# Patient Record
Sex: FEMALE | Race: ASIAN | NOT HISPANIC OR LATINO | Employment: FULL TIME | URBAN - METROPOLITAN AREA
[De-identification: names, ages, dates, MRNs, and addresses within clinical notes are randomized per-mention and may not be internally consistent; named-entity substitution may affect disease eponyms.]

---

## 2018-01-12 ENCOUNTER — OFFICE VISIT (OUTPATIENT)
Dept: MEDICAL GROUP | Facility: MEDICAL CENTER | Age: 23
End: 2018-01-12
Payer: COMMERCIAL

## 2018-01-12 ENCOUNTER — HOSPITAL ENCOUNTER (OUTPATIENT)
Facility: MEDICAL CENTER | Age: 23
End: 2018-01-12
Attending: FAMILY MEDICINE
Payer: COMMERCIAL

## 2018-01-12 VITALS
RESPIRATION RATE: 16 BRPM | HEART RATE: 71 BPM | BODY MASS INDEX: 17.24 KG/M2 | SYSTOLIC BLOOD PRESSURE: 100 MMHG | OXYGEN SATURATION: 93 % | WEIGHT: 101 LBS | HEIGHT: 64 IN | DIASTOLIC BLOOD PRESSURE: 78 MMHG | TEMPERATURE: 98.1 F

## 2018-01-12 DIAGNOSIS — Z12.4 SCREENING FOR CERVICAL CANCER: ICD-10-CM

## 2018-01-12 DIAGNOSIS — Z11.3 ROUTINE SCREENING FOR STI (SEXUALLY TRANSMITTED INFECTION): ICD-10-CM

## 2018-01-12 DIAGNOSIS — Z01.419 WELL FEMALE EXAM WITH ROUTINE GYNECOLOGICAL EXAM: ICD-10-CM

## 2018-01-12 PROCEDURE — 99395 PREV VISIT EST AGE 18-39: CPT | Performed by: FAMILY MEDICINE

## 2018-01-12 NOTE — PROGRESS NOTES
cc: well exam    Subjective:     Nathalia Olson is a 23 y.o. female presents for a routine preventive health exam.    Ob-Gyn/ History:    /Para:    Last Pap Smear:  2017, ascus +other high risk hpv  Gyn Surgery:  none.  Current Contraceptive Method:  none. is currently sexually active.  Last menstrual period:  Last week.  Periods irregular  Folate intake: discussed     Health Maintenance  Advanced directive: n/a   Osteoporosis Screen/ DEXA: n/a   PT/vit D for falls prevention: n/a   Cholesterol Screening: declined   Diabetes Screening: declined  AAA Screening: n/a   Aspirin Use: n/a    Diet: well rounded   Exercise: regular   Substance Abuse: none   Safe in relationship.   Seat belts, bike helmet, gun safety discussed.  Sun protection used.    Cancer screening  Colorectal Cancer Screening: n/a    Lung Cancer Screening: n/a    Cervical Cancer Screening: collected today    Breast Cancer Screening: n/a     Infectious disease screening/Immunizations  --STI Screening: negative 2016, ordered today   --Practices safe sex.  --HIV Screening: negative 2016   --Hepatitis C Screening: n/a   --Immunizations:    Influenza: declined    HPV:  Up to date, pt to bring in records    Tetanus: Up to date, pt to bring in records    Shingles: n/a    Pneumococcal : n/a     Other immunizations: Up to date, pt to bring in records    Review of systems:   All systems were reviewed and are negative.    No current outpatient prescriptions on file.    No Known Allergies    History reviewed. No pertinent past medical history.  History reviewed. No pertinent surgical history.  Family History   Problem Relation Age of Onset   • Hypertension Mother      Social History     Social History   • Marital status: Single     Spouse name: N/A   • Number of children: N/A   • Years of education: N/A     Occupational History   • Not on file.     Social History Main Topics   • Smoking status: Never Smoker   • Smokeless tobacco: Never Used   •  "Alcohol use No   • Drug use: No   • Sexual activity: Yes     Partners: Male     Birth control/ protection: Injection     Other Topics Concern   • Not on file     Social History Narrative   • No narrative on file       Objective:     Vitals: /78   Pulse 71   Temp 36.7 °C (98.1 °F)   Resp 16   Ht 1.626 m (5' 4\")   Wt 45.8 kg (101 lb)   SpO2 93%   Breastfeeding? No   BMI 17.34 kg/m²   General: Alert, pleasant, NAD  HEENT:  Normocephalic.  PERRL, EOMI, no icterus or pallor.  Conjunctivae and lids normal.  External ears normal. Tympanic membranes pearly, opaque.  Nares patent, mucosa pink.  Oropharynx non-erythematous, mucous membranes moist.  Neck supple.  No thyromegaly or masses palpated. No cervical or supraclavicular lymphadenopathy.  Heart:  Regular rate and rhythm.  S1 and S2 normal.  No murmurs appreciated.  Respiratory:  Normal respiratory effort.  Clear to auscultation bilaterally.    Abdomen:  Bowel sounds present.  Non-distended, soft, non-tender, no guarding/rebound. No hepatosplenomegaly.  No hernias.  Pelvic exam: Normal external genitalia including urethral meatus.  Well supported, nontender urethra and bladder.  Vagina and cervix without significant discharge or lesions.  No cervical motion tenderness.  Anteflexed uterus of normal size, shape, and consistency.  No adnexal masses or tenderness.     Rectal:  Normal external anus, no hemorrhoids.   Skin:  Warm, dry, no rashes  Musculoskeletal:  Gait is normal.  Moves all extremities well.  Extremities:  Pedal pulses 2+ symmetric. No leg edema.  Neurological: No tremors, sensation grossly intact, patellar and biceps reflexes 2+ symmetric, tone/strength normal  Psych:  Affect/mood is normal, judgement is good, memory is intact, grooming is appropriate.      Assessment/Plan:     Nathalia was seen today for annual exam.    Diagnoses and all orders for this visit:    Well female exam with routine gynecological exam  Screening for cervical " cancer  Routine screening for STI (sexually transmitted infection)  -     THINPREP PAP W/CTNG; Future    Patient Counseling:  --Discussed moderation in sodium/caffeine intake, saturated fat and cholesterol, caloric balance, sufficient fresh fruits/vegetables, fiber, iron, and 0.4-0.8mg of folate supplement per day  --Discussed brushing, flossing, and dental visits.   --Encouraged regular exercise.   --Discussed tobacco, alcohol, or other drug use; availability of treatment for abuse.   --Discussed sexually transmitted infections, partner selection, use of condoms, avoidance of unintended pregnancy and contraceptive alternatives.  --Injury prevention: Discussed safety belts, safety helmets, smoke detector, etc.    Preventive visit in 1 year, sooner as needed for any concerns.

## 2018-01-16 LAB
C TRACH RRNA CVX QL NAA+PROBE: NEGATIVE
CYTOLOGIST CVX/VAG CYTO: NORMAL
N GONORRHOEA RRNA CVX QL NAA+PROBE: NEGATIVE
NOTE  190109: NORMAL
OTHER STN SPEC: NORMAL
PATH REPORT.FINAL DX SPEC: NORMAL
STAT OF ADQ CVX/VAG CYTO-IMP: NORMAL

## 2019-03-18 ENCOUNTER — OFFICE VISIT (OUTPATIENT)
Dept: MEDICAL GROUP | Facility: MEDICAL CENTER | Age: 24
End: 2019-03-18
Payer: COMMERCIAL

## 2019-03-18 VITALS
WEIGHT: 102 LBS | HEIGHT: 64 IN | BODY MASS INDEX: 17.42 KG/M2 | TEMPERATURE: 98.6 F | RESPIRATION RATE: 14 BRPM | DIASTOLIC BLOOD PRESSURE: 68 MMHG | OXYGEN SATURATION: 96 % | SYSTOLIC BLOOD PRESSURE: 100 MMHG | HEART RATE: 76 BPM

## 2019-03-18 DIAGNOSIS — N91.2 ABSENT PERIODS: ICD-10-CM

## 2019-03-18 PROCEDURE — 99213 OFFICE O/P EST LOW 20 MIN: CPT | Performed by: FAMILY MEDICINE

## 2019-03-18 ASSESSMENT — PATIENT HEALTH QUESTIONNAIRE - PHQ9: CLINICAL INTERPRETATION OF PHQ2 SCORE: 0

## 2019-03-18 NOTE — PROGRESS NOTES
"cc: Absent periods    Subjective:     Nathalia Olson is a 24 y.o. female presenting to discuss absent periods.  She has a history of irregular periods in the past, was on Depakote for years and was amenorrheic.  She has been off birth control for over 2 years, still has not had her period.  She occasionally gets light spotting for less than a day.  Denies any cramps, breast changes, breast discharge, chest pain, shortness of breath, lightheadedness, dizziness, tremors, heat/cold intolerance, diarrhea, constipation, abdominal pain, tremors.  She has a good appetite, but is unable to gain much weight.  She has been stable at 105-110 pounds.      Review of systems:  See above.     No current outpatient prescriptions on file.    Allergies, past medical history, past surgical history, family history, social history reviewed and updated    Objective:     Vitals: /68 (BP Location: Right arm, Patient Position: Sitting)   Pulse 76   Temp 37 °C (98.6 °F) (Temporal)   Resp 14   Ht 1.626 m (5' 4\")   Wt 46.3 kg (102 lb)   SpO2 96%   BMI 17.51 kg/m²   General: Alert, pleasant, NAD  HEENT: Normocephalic.  EOMI, no icterus or pallor.  Conjunctivae and lids normal. External ears normal. Oropharynx non-erythematous, mucous membranes moist.  Neck supple.  No thyromegaly or masses palpated. No cervical or supraclavicular lymphadenopathy.  Heart: Regular rate and rhythm.  S1 and S2 normal.  No murmurs appreciated.  Respiratory: Normal respiratory effort.  Clear to auscultation bilaterally.  Abdomen: Non-distended, soft. non-tender, no guarding/rebound. Bowel sounds present.  No hepatosplenomegaly.  No hernias.  Skin: Warm, dry, no rashes.  Musculoskeletal: Gait is normal.  Moves all extremities well.  Extremities: No leg edema.  Psych:  Affect/mood is normal, judgement is good, memory is intact, grooming is appropriate.    Assessment/Plan:     Diagnoses and all orders for this visit:    Absent periods  Will check the " following labs, ultrasound.  If normal, will refer to OB/GYN.  Also reminded patient that she is due for a Pap.  She had ASCUS with positive HPV 9/2017, normal Pap 1/2018.  -     CBC WITHOUT DIFFERENTIAL; Future  -     Comp Metabolic Panel; Future  -     TSH WITH REFLEX TO FT4; Future  -     PROLACTIN; Future  -     US-PELVIC TRANSVAGINAL ONLY; Future  -     HCG QUANTITATIVE; Future

## 2019-03-28 ENCOUNTER — HOSPITAL ENCOUNTER (OUTPATIENT)
Dept: LAB | Facility: MEDICAL CENTER | Age: 24
End: 2019-03-28
Attending: FAMILY MEDICINE
Payer: COMMERCIAL

## 2019-03-28 DIAGNOSIS — N91.2 ABSENT PERIODS: ICD-10-CM

## 2019-03-28 LAB
ALBUMIN SERPL BCP-MCNC: 4.5 G/DL (ref 3.2–4.9)
ALBUMIN/GLOB SERPL: 1.9 G/DL
ALP SERPL-CCNC: 33 U/L (ref 30–99)
ALT SERPL-CCNC: 11 U/L (ref 2–50)
ANION GAP SERPL CALC-SCNC: 9 MMOL/L (ref 0–11.9)
AST SERPL-CCNC: 17 U/L (ref 12–45)
B-HCG SERPL-ACNC: <0.6 MIU/ML (ref 0–5)
BILIRUB SERPL-MCNC: 0.8 MG/DL (ref 0.1–1.5)
BUN SERPL-MCNC: 8 MG/DL (ref 8–22)
CALCIUM SERPL-MCNC: 9.2 MG/DL (ref 8.5–10.5)
CHLORIDE SERPL-SCNC: 109 MMOL/L (ref 96–112)
CO2 SERPL-SCNC: 25 MMOL/L (ref 20–33)
CREAT SERPL-MCNC: 0.74 MG/DL (ref 0.5–1.4)
ERYTHROCYTE [DISTWIDTH] IN BLOOD BY AUTOMATED COUNT: 44.2 FL (ref 35.9–50)
GLOBULIN SER CALC-MCNC: 2.4 G/DL (ref 1.9–3.5)
GLUCOSE SERPL-MCNC: 85 MG/DL (ref 65–99)
HCT VFR BLD AUTO: 42.6 % (ref 37–47)
HGB BLD-MCNC: 13.6 G/DL (ref 12–16)
MCH RBC QN AUTO: 30 PG (ref 27–33)
MCHC RBC AUTO-ENTMCNC: 31.9 G/DL (ref 33.6–35)
MCV RBC AUTO: 94 FL (ref 81.4–97.8)
PLATELET # BLD AUTO: 263 K/UL (ref 164–446)
PMV BLD AUTO: 11 FL (ref 9–12.9)
POTASSIUM SERPL-SCNC: 3.8 MMOL/L (ref 3.6–5.5)
PROLACTIN SERPL-MCNC: 39.38 NG/ML (ref 2.8–26)
PROT SERPL-MCNC: 6.9 G/DL (ref 6–8.2)
RBC # BLD AUTO: 4.53 M/UL (ref 4.2–5.4)
SODIUM SERPL-SCNC: 143 MMOL/L (ref 135–145)
TSH SERPL DL<=0.005 MIU/L-ACNC: 0.5 UIU/ML (ref 0.38–5.33)
WBC # BLD AUTO: 4.4 K/UL (ref 4.8–10.8)

## 2019-03-28 PROCEDURE — 85027 COMPLETE CBC AUTOMATED: CPT

## 2019-03-28 PROCEDURE — 84443 ASSAY THYROID STIM HORMONE: CPT

## 2019-03-28 PROCEDURE — 36415 COLL VENOUS BLD VENIPUNCTURE: CPT

## 2019-03-28 PROCEDURE — 80053 COMPREHEN METABOLIC PANEL: CPT

## 2019-03-28 PROCEDURE — 84702 CHORIONIC GONADOTROPIN TEST: CPT

## 2019-03-28 PROCEDURE — 84146 ASSAY OF PROLACTIN: CPT

## 2019-04-01 ENCOUNTER — TELEPHONE (OUTPATIENT)
Dept: MEDICAL GROUP | Facility: MEDICAL CENTER | Age: 24
End: 2019-04-01

## 2019-04-01 DIAGNOSIS — N91.2 ABSENT PERIODS: ICD-10-CM

## 2019-04-01 DIAGNOSIS — R79.89 ELEVATED PROLACTIN LEVEL: ICD-10-CM

## 2019-04-03 NOTE — TELEPHONE ENCOUNTER
Left message for patient.  Will send my chart message and mail a letter.  MRI of the brain ordered

## 2019-04-15 ENCOUNTER — TELEPHONE (OUTPATIENT)
Dept: OBGYN | Facility: CLINIC | Age: 24
End: 2019-04-15

## 2019-04-15 NOTE — TELEPHONE ENCOUNTER
Pt called, was a prior patient of Dr. Golden and would like to get IUD. Informed pt she will see jessy DYKES and needs annual and IUD consultation at the time, pt verbalized understanding and agrees, call transferred to [facility] to schedule

## 2019-04-19 ENCOUNTER — HOSPITAL ENCOUNTER (OUTPATIENT)
Facility: MEDICAL CENTER | Age: 24
End: 2019-04-19
Attending: OBSTETRICS & GYNECOLOGY
Payer: COMMERCIAL

## 2019-04-19 ENCOUNTER — GYNECOLOGY VISIT (OUTPATIENT)
Dept: OBGYN | Facility: CLINIC | Age: 24
End: 2019-04-19
Payer: COMMERCIAL

## 2019-04-19 VITALS
BODY MASS INDEX: 17.66 KG/M2 | DIASTOLIC BLOOD PRESSURE: 64 MMHG | SYSTOLIC BLOOD PRESSURE: 98 MMHG | HEIGHT: 62 IN | WEIGHT: 96 LBS

## 2019-04-19 DIAGNOSIS — Z12.4 SCREENING FOR CERVICAL CANCER: ICD-10-CM

## 2019-04-19 DIAGNOSIS — Z30.015 ENCOUNTER FOR INITIAL PRESCRIPTION OF VAGINAL RING HORMONAL CONTRACEPTIVE: ICD-10-CM

## 2019-04-19 DIAGNOSIS — Z11.51 SPECIAL SCREENING EXAMINATION FOR HUMAN PAPILLOMAVIRUS (HPV): ICD-10-CM

## 2019-04-19 DIAGNOSIS — Z01.419 WELL WOMAN EXAM: ICD-10-CM

## 2019-04-19 PROCEDURE — 87491 CHLMYD TRACH DNA AMP PROBE: CPT

## 2019-04-19 PROCEDURE — 88175 CYTOPATH C/V AUTO FLUID REDO: CPT

## 2019-04-19 PROCEDURE — 87591 N.GONORRHOEAE DNA AMP PROB: CPT

## 2019-04-19 PROCEDURE — 99395 PREV VISIT EST AGE 18-39: CPT | Performed by: OBSTETRICS & GYNECOLOGY

## 2019-04-19 PROCEDURE — 87624 HPV HI-RISK TYP POOLED RSLT: CPT

## 2019-04-19 RX ORDER — ETONOGESTREL AND ETHINYL ESTRADIOL VAGINAL RING .015; .12 MG/D; MG/D
RING VAGINAL
Qty: 3 EACH | Refills: 3 | Status: SHIPPED | OUTPATIENT
Start: 2019-04-19 | End: 2020-03-18 | Stop reason: SDUPTHER

## 2019-04-19 NOTE — PROGRESS NOTES
"Nathalia Ferguson Ha24 y.o.  female presents for Annual Well Woman Exam.    ROS: Patient is feeling well. No dyspnea or chest pain on exertion. No Abdominal pain, change in bowel habits, black or bloody stools. No urinary sx. GYN ROS:normal menses, no abnormal bleeding, pelvic pain or discharge, no breast pain or new or enlarging lumps on self exam, no discharge or pelvic pain, she complains of amenorrhea. Denies breast tenderness, mass, discharge, changes in size or contour, or abnormal cyclic discomfort., had breast augmentation 2018 in [facility]. No neurological complaints. Not on contraception currently.   No past medical history on file.  None  Allergy:   Patient has no known allergies.    LMP:       Patient's last menstrual period was 2019 (exact date). .     Menstrual History: menses irregular: amenorrheic, not on contraception  Contraceptive Method:none, uses condoms      Objective : The patient appears well, alert and oriented x 3, in no acute distress.  BP (!) 98/64 (BP Location: Right arm, Patient Position: Sitting)   Ht 1.575 m (5' 2\")   Wt 43.5 kg (96 lb)   HEENT Exam: EOMI, YODIT, no adenopathy or thyromegaly.  Lungs: Clear to Auscultation and Percussion.  Cor: S1 and S2 normal, no murmurs, or rubs   Abdomen: Soft without tenderness, guarding mass or organomegaly.  Extremities: No edema, pulses equal  Neurological: Normal No focal signs  Breast Exam:Inspection negative. No nipple discharge or bleeding. No masses or nodularity palpable, breast augmentation  Pelvic: External genitalia,urethral meatus, urethra, bladder and vagina normal. Cervix, uterus and adnexa intact and normal.  Anus and perineum normal. Bimanual and rectovaginal without masses or tenderness., negative findings: external genitalia normal, cervix clear, normal sized uterus, adnexae negative, no pelvic or rectal masses    Lab:No results found for this or any previous visit (from the past 336 hour(s)).    Assessment:  well " woman, needs hormonal contraception to protect bones. Discussed low weight and need to gain weight in order to protect bones. Patient works the night shift and states she would sometimes rather sleep than eat. She has tried protein shakes and this does not help. She DOES want to gain weight. Her ideal would be 120.     Plan:pap smear  counseled on breast self exam, STD prevention, HIV risk factors and prevention, use and side effects of OCPs, osteoporosis and adequate intake of calcium and vitamin D  return annually or prn  Safe Sex/STD Prevention  Self Breast Exams  Calcium Supplements  Weight Bearing Exercise   Contraception:transvaginal hormones (NuvaRing)

## 2019-04-24 LAB
C TRACH DNA GENITAL QL NAA+PROBE: NEGATIVE
CYTOLOGY REG CYTOL: NORMAL
HPV HR 12 DNA CVX QL NAA+PROBE: NEGATIVE
HPV16 DNA SPEC QL NAA+PROBE: NEGATIVE
HPV18 DNA SPEC QL NAA+PROBE: NEGATIVE
N GONORRHOEA DNA GENITAL QL NAA+PROBE: NEGATIVE
SPECIMEN SOURCE: NORMAL
SPECIMEN SOURCE: NORMAL

## 2020-03-18 RX ORDER — ETONOGESTREL AND ETHINYL ESTRADIOL VAGINAL RING .015; .12 MG/D; MG/D
RING VAGINAL
Qty: 3 EACH | Refills: 3 | Status: SHIPPED | OUTPATIENT
Start: 2020-03-18 | End: 2022-01-11

## 2020-03-18 NOTE — PROGRESS NOTES
Prescription sent for patient with 4 month supply. Can schedule for annual exam in the next 2-3 months.     Martinez

## 2020-05-20 ENCOUNTER — HOSPITAL ENCOUNTER (OUTPATIENT)
Facility: MEDICAL CENTER | Age: 25
End: 2020-05-20
Payer: COMMERCIAL

## 2020-05-23 LAB
SARS-COV-2 RNA SPEC QL NAA+PROBE: NOT DETECTED
SPECIMEN SOURCE: NORMAL

## 2020-12-02 ENCOUNTER — GYNECOLOGY VISIT (OUTPATIENT)
Dept: OBGYN | Facility: CLINIC | Age: 25
End: 2020-12-02
Payer: COMMERCIAL

## 2020-12-02 ENCOUNTER — HOSPITAL ENCOUNTER (OUTPATIENT)
Facility: MEDICAL CENTER | Age: 25
End: 2020-12-02
Attending: OBSTETRICS & GYNECOLOGY
Payer: COMMERCIAL

## 2020-12-02 VITALS — WEIGHT: 119 LBS | BODY MASS INDEX: 21.77 KG/M2 | DIASTOLIC BLOOD PRESSURE: 75 MMHG | SYSTOLIC BLOOD PRESSURE: 105 MMHG

## 2020-12-02 DIAGNOSIS — Z01.419 ENCOUNTER FOR ANNUAL ROUTINE GYNECOLOGICAL EXAMINATION: ICD-10-CM

## 2020-12-02 DIAGNOSIS — Z32.02 PREGNANCY TEST NEGATIVE: ICD-10-CM

## 2020-12-02 DIAGNOSIS — Z31.69 INFERTILITY COUNSELING: ICD-10-CM

## 2020-12-02 DIAGNOSIS — Z12.4 CERVICAL CANCER SCREENING: ICD-10-CM

## 2020-12-02 DIAGNOSIS — N93.9 ABNORMAL UTERINE BLEEDING: ICD-10-CM

## 2020-12-02 LAB
INT CON NEG: NORMAL
INT CON POS: NORMAL
POC URINE PREGNANCY TEST: NEGATIVE

## 2020-12-02 PROCEDURE — 88175 CYTOPATH C/V AUTO FLUID REDO: CPT

## 2020-12-02 PROCEDURE — 87591 N.GONORRHOEAE DNA AMP PROB: CPT

## 2020-12-02 PROCEDURE — 81025 URINE PREGNANCY TEST: CPT | Performed by: OBSTETRICS & GYNECOLOGY

## 2020-12-02 PROCEDURE — 99395 PREV VISIT EST AGE 18-39: CPT | Performed by: OBSTETRICS & GYNECOLOGY

## 2020-12-02 PROCEDURE — 87491 CHLMYD TRACH DNA AMP PROBE: CPT

## 2020-12-02 ASSESSMENT — FIBROSIS 4 INDEX: FIB4 SCORE: 0.51

## 2020-12-02 NOTE — PROGRESS NOTES
Well woman visit     Nathalia Olson is a 25 y.o.  who presents for her annual exam. She has been off of birth control since April and she hasn't had regular cycles since then.    GYN History:  LMP:had some spotting on 20  Menarche: 13  Menses: irregular  Menopause: no  Last pap: 2018- normal. She states she had an abnormal 3 years ago and she was supposed to get a colposcopy but she had issues with her insurance and her next pap was normal.   Sexually active: yes  History of STDs: no  Fam Hx of breast, ovarian, or colon cancer: no     OB History:        Health Maintenance:  Last mammogram: no concerns  Last colorectal screening: n/a  Immunizations: had the Gardasil     Review of Systems:   Pertinent positives documented in HPI and all other systems reviewed & are negative.    All PMH, PSH, allergies, social history and FH reviewed and updated today:  Past Medical History:  History reviewed. No pertinent past medical history.    Past Surgical History:  History reviewed. No pertinent surgical history.    Medications:   Current Outpatient Medications Ordered in Epic   Medication Sig Dispense Refill   • ethinyl estradiol-etonogestrel (NUVARING) 0.12-0.015 MG/24HR vaginal ring Insert ring for three weeks, then remove for one week, then reinsert new ring after the end of that week. 3 Each 3     No current Epic-ordered facility-administered medications on file.        Allergies: Patient has no known allergies.    Social History:  Social History     Socioeconomic History   • Marital status: Single     Spouse name: Not on file   • Number of children: Not on file   • Years of education: Not on file   • Highest education level: Not on file   Occupational History   • Not on file   Social Needs   • Financial resource strain: Not on file   • Food insecurity     Worry: Not on file     Inability: Not on file   • Transportation needs     Medical: Not on file     Non-medical: Not on file   Tobacco Use   • Smoking  status: Never Smoker   • Smokeless tobacco: Never Used   Substance and Sexual Activity   • Alcohol use: No     Alcohol/week: 0.0 oz   • Drug use: No   • Sexual activity: Yes     Partners: Male     Birth control/protection: Injection   Lifestyle   • Physical activity     Days per week: Not on file     Minutes per session: Not on file   • Stress: Not on file   Relationships   • Social connections     Talks on phone: Not on file     Gets together: Not on file     Attends Yazidism service: Not on file     Active member of club or organization: Not on file     Attends meetings of clubs or organizations: Not on file     Relationship status: Not on file   • Intimate partner violence     Fear of current or ex partner: Not on file     Emotionally abused: Not on file     Physically abused: Not on file     Forced sexual activity: Not on file   Other Topics Concern   • Not on file   Social History Narrative   • Not on file       Family History:  Family History   Problem Relation Age of Onset   • Hypertension Mother            Objective:   Vitals:  /75   Wt 54 kg (119 lb)   Body mass index is 21.77 kg/m². (Goal BM I>18 <25)    Physical Exam:   Nursing note and vitals reviewed.  Physical Exam   Constitutional: She is oriented to person, place, and time and well-developed, well-nourished, and in no distress.   HENT:   Head: Normocephalic and atraumatic.   Right Ear: External ear normal.   Eyes: Pupils are equal, round, and reactive to light. Conjunctivae and EOM are normal.   Neck: Normal range of motion. Neck supple. No tracheal deviation present. No thyromegaly present.   Cardiovascular: Intact distal pulses.   Pulmonary/Chest: Effort normal and breath sounds normal.   Abdominal: Soft. Bowel sounds are normal. She exhibits no distension. There is no abdominal tenderness.   Musculoskeletal: Normal range of motion.   Neurological: She is alert and oriented to person, place, and time. Gait normal.   Skin: Skin is warm and  dry.   Psychiatric: Mood, memory, affect and judgment normal.     Genitourinary:  Normal appearing external female genitalia without any rashes, lesions, labial fusion or tenderness.  Vagina is pink moist and well rugated. Physiologic discharge present within vaginal vault.  Cervix well visualized without masses or lesions.  On bimanual exam there is no cervical motion tenderness, uterus is midline, not enlarged, fixed, or tender.  No adnexal masses or tenderness.   Breast: bilateral augmentation. No masses, skin dimpling, or lymphadenopathy noted bilaterally.  No nipple discharge.  Nipples everted.      Assessment/Plan:     1. Pregnancy test negative  POCT Pregnancy   2. Abnormal uterine bleeding  TSH+FREE T4    PROLACTIN    FSH/LH    ESTROGEN TOTAL    TESTOSTERONE SERUM   3. Infertility counseling  SEMEN ANALYSIS,BASIC   4. Cervical cancer screening  THINPREP RFLX HPV ASCUS W/CTNG   5. Encounter for annual routine gynecological examination         Nathalia Olson is a 25 y.o.  female who presents for her annual gynecologic exam.   # Preventative health care:   --Breast self awareness discussed.   --Cervical cancer screening. Last Pap 2018. Collected today. HPV reflex sent. I will follow up with patient once results are back as per ASCCP guidelines.   --Smoking not a smoker.   --Colorectal screening not indicated.   --Immunizations are up to date.  --Diet, exercise, vitamin supplementation, and hydration discussed.  # Contraception: wants to get pregnant  # STD screening: off of pap  # AUB- wants to conceive. Will start with ordering labs and semen analysis. Consider HSG if labs are normal. Then Femara for ovulation induction.   # Follow up in one month to discuss results.

## 2020-12-04 DIAGNOSIS — Z12.4 CERVICAL CANCER SCREENING: ICD-10-CM

## 2020-12-07 LAB
C TRACH DNA GENITAL QL NAA+PROBE: NEGATIVE
CYTOLOGY REG CYTOL: NORMAL
N GONORRHOEA DNA GENITAL QL NAA+PROBE: NEGATIVE
SPECIMEN SOURCE: NORMAL

## 2021-01-07 ENCOUNTER — TELEPHONE (OUTPATIENT)
Dept: MEDICAL GROUP | Facility: MEDICAL CENTER | Age: 26
End: 2021-01-07

## 2021-01-07 DIAGNOSIS — R52 BODY ACHES: ICD-10-CM

## 2021-01-07 DIAGNOSIS — R05.9 COUGH: ICD-10-CM

## 2021-01-07 DIAGNOSIS — R09.81 NASAL CONGESTION: ICD-10-CM

## 2021-01-07 DIAGNOSIS — R50.9 FEVER, UNSPECIFIED FEVER CAUSE: ICD-10-CM

## 2021-01-07 NOTE — TELEPHONE ENCOUNTER
Pt called and asked if you could put in an order for her to get tested for covid, she is having symptoms for the past 4 days, as well as was in contact with someone who was positive.,

## 2021-01-22 ENCOUNTER — TELEPHONE (OUTPATIENT)
Dept: OBGYN | Facility: CLINIC | Age: 26
End: 2021-01-22

## 2021-02-01 ENCOUNTER — TELEPHONE (OUTPATIENT)
Dept: OBGYN | Facility: CLINIC | Age: 26
End: 2021-02-01

## 2021-03-03 ENCOUNTER — GYNECOLOGY VISIT (OUTPATIENT)
Dept: OBGYN | Facility: CLINIC | Age: 26
End: 2021-03-03
Payer: COMMERCIAL

## 2021-03-03 VITALS — BODY MASS INDEX: 23.19 KG/M2 | DIASTOLIC BLOOD PRESSURE: 62 MMHG | WEIGHT: 126.8 LBS | SYSTOLIC BLOOD PRESSURE: 110 MMHG

## 2021-03-03 DIAGNOSIS — E28.2 PCOS (POLYCYSTIC OVARIAN SYNDROME): ICD-10-CM

## 2021-03-03 PROCEDURE — 99213 OFFICE O/P EST LOW 20 MIN: CPT | Performed by: OBSTETRICS & GYNECOLOGY

## 2021-03-03 ASSESSMENT — FIBROSIS 4 INDEX: FIB4 SCORE: 0.53

## 2021-03-03 NOTE — PROGRESS NOTES
GYN Consult    CC/reason for consult: FU results    HPI: Nathalia Olson is a 26 y.o.  with PCOS based off of labs. She is still not having periods on her own.  She has not been using any contraceptive for the last year and has not conceived.  She would like to have a baby.      ROS:  constitutional: denies fevers, general concerns  CV: denies chest pain, palpitations, edema  Resp: denies shortness of breath, cough  GI: denies abd pain, N/V, diarrhea/constipation, blood in stool  : denies irregular vaginal bleeding, discharge, pain, denies urinary complaints  Neuro: denies hx of migraines w/ aura  Endo: denies significant weight changes, irregular menses, temperature intolerance, denies hotflashes/nightsweats  Heme/lymph: denies easy bleeding/bruising, denies swollen glands  Psych: denies concerns about mood, denies SI  Allergy: denies concerns        OB History    Para Term  AB Living   0 0 0 0 0 0   SAB TAB Ectopic Molar Multiple Live Births   0 0 0   0         History reviewed. No pertinent past medical history.    History reviewed. No pertinent surgical history.    Medications:   Current Outpatient Medications Ordered in Epic   Medication Sig Dispense Refill   • ethinyl estradiol-etonogestrel (NUVARING) 0.12-0.015 MG/24HR vaginal ring Insert ring for three weeks, then remove for one week, then reinsert new ring after the end of that week. 3 Each 3     No current Epic-ordered facility-administered medications on file.       Allergies: Patient has no known allergies.    Social History     Socioeconomic History   • Marital status: Single     Spouse name: Not on file   • Number of children: Not on file   • Years of education: Not on file   • Highest education level: Not on file   Occupational History   • Not on file   Tobacco Use   • Smoking status: Never Smoker   • Smokeless tobacco: Never Used   Substance and Sexual Activity   • Alcohol use: No     Alcohol/week: 0.0 oz   • Drug use: No   •  Sexual activity: Yes     Partners: Male     Birth control/protection: Injection   Other Topics Concern   • Not on file   Social History Narrative   • Not on file     Social Determinants of Health     Financial Resource Strain:    • Difficulty of Paying Living Expenses:    Food Insecurity:    • Worried About Running Out of Food in the Last Year:    • Ran Out of Food in the Last Year:    Transportation Needs:    • Lack of Transportation (Medical):    • Lack of Transportation (Non-Medical):    Physical Activity:    • Days of Exercise per Week:    • Minutes of Exercise per Session:    Stress:    • Feeling of Stress :    Social Connections:    • Frequency of Communication with Friends and Family:    • Frequency of Social Gatherings with Friends and Family:    • Attends Druze Services:    • Active Member of Clubs or Organizations:    • Attends Club or Organization Meetings:    • Marital Status:    Intimate Partner Violence:    • Fear of Current or Ex-Partner:    • Emotionally Abused:    • Physically Abused:    • Sexually Abused:        Family History   Problem Relation Age of Onset   • Hypertension Mother      Denies hx of GI/GYN/breast cancers    Physical Exam:  /62 (BP Location: Right arm, Patient Position: Sitting, BP Cuff Size: Adult)   Wt 57.5 kg (126 lb 12.8 oz)   BMI 23.19 kg/m²   gen: AAO, NAD, affect appropriate  CV: no LE edema  Resp: Symmetric non labored breathing  Skin: warm/dry, no lesions  Psych: normal mood and affect    A/P: 26 y.o.  with PCOS  1. PCOS (polycystic ovarian syndrome)  HEMOGLOBIN A1C    REFERRAL TO INFERTILITY     Discussed diagnosis with patient today.  Explained PCOS and why it causes anovulation.  Also explained insulin resistance, and with PCOS.  We did order hemoglobin A1c.  I discussed with her the increased risk for twins in women with PCOS.  I discussed with her that I could offer her ovulation induction in this office however I would not offer ultrasounds to  screen for if she has more than one dominant follicle before ovulation.  I discussed that twin pregnancies carry increased risk including gestational diabetes, hypertension,  labor, and  section.  She states that she would like a referral to the fertility specialist in [facility] for ovulation induction.  I do not have a record of her partner's semen analysis.  I encouraged her to have him collect a record for himself so that he can take that with them to their initial consult with them.    15 minutes were spent with patient ad more than 50% of that time was spent counseling face-to-face, educating and coordinating care.

## 2021-03-03 NOTE — NON-PROVIDER
Pt here for Gyn/Follow-up visit  Good Phone#: [phone]  Pharmacy verified.  Pt states would like to discuss results.  Pt states no other complaints for today.

## 2021-03-10 DIAGNOSIS — N91.2 AMENORRHEA: ICD-10-CM

## 2021-03-15 ENCOUNTER — TELEPHONE (OUTPATIENT)
Dept: OBGYN | Facility: CLINIC | Age: 26
End: 2021-03-15

## 2021-03-15 NOTE — TELEPHONE ENCOUNTER
Called Pt butN/A, LVMTCB. Left message regarding if Pt has had seen Dr. Myers's patient message sent to her. If Pt calls back, please see Dr. Myers's previous note on 3/10/2021. Thank you

## 2021-03-16 ENCOUNTER — TELEPHONE (OUTPATIENT)
Dept: OBGYN | Facility: CLINIC | Age: 26
End: 2021-03-16

## 2021-03-16 NOTE — TELEPHONE ENCOUNTER
Pt called about recent lab results. She also asked about cost of HSG. I informed her she would need to call the lab for accurate cost.

## 2021-03-18 ENCOUNTER — TELEPHONE (OUTPATIENT)
Dept: OBGYN | Facility: CLINIC | Age: 26
End: 2021-03-18

## 2021-03-18 NOTE — TELEPHONE ENCOUNTER
Patient called today about her HSG that she is suppose to get done. She tried calling radiology to schedule but they would not schedule her because she has not not had a period in the last six months. Per my consult with  she advised that I talk with Dr. Alan to see if she wants to put her on medication to see if she gets her period. Or if she can talk to radiology about this.

## 2021-03-23 NOTE — PROGRESS NOTES
[facility] semen analysis abnormal. Patient has an appointment 4/1/21 with [facility]

## 2022-01-11 ENCOUNTER — HOSPITAL ENCOUNTER (OUTPATIENT)
Facility: MEDICAL CENTER | Age: 27
End: 2022-01-11
Attending: FAMILY MEDICINE
Payer: COMMERCIAL

## 2022-01-11 ENCOUNTER — OFFICE VISIT (OUTPATIENT)
Dept: URGENT CARE | Facility: PHYSICIAN GROUP | Age: 27
End: 2022-01-11
Payer: COMMERCIAL

## 2022-01-11 VITALS
WEIGHT: 125.8 LBS | BODY MASS INDEX: 22.29 KG/M2 | SYSTOLIC BLOOD PRESSURE: 104 MMHG | OXYGEN SATURATION: 95 % | DIASTOLIC BLOOD PRESSURE: 62 MMHG | RESPIRATION RATE: 16 BRPM | HEART RATE: 71 BPM | TEMPERATURE: 98 F | HEIGHT: 63 IN

## 2022-01-11 DIAGNOSIS — Z03.818 ENCOUNTER FOR PATIENT CONCERN ABOUT EXPOSURE TO INFECTIOUS ORGANISM: ICD-10-CM

## 2022-01-11 PROCEDURE — 99213 OFFICE O/P EST LOW 20 MIN: CPT | Mod: CS | Performed by: FAMILY MEDICINE

## 2022-01-11 PROCEDURE — U0003 INFECTIOUS AGENT DETECTION BY NUCLEIC ACID (DNA OR RNA); SEVERE ACUTE RESPIRATORY SYNDROME CORONAVIRUS 2 (SARS-COV-2) (CORONAVIRUS DISEASE [COVID-19]), AMPLIFIED PROBE TECHNIQUE, MAKING USE OF HIGH THROUGHPUT TECHNOLOGIES AS DESCRIBED BY CMS-2020-01-R: HCPCS

## 2022-01-11 RX ORDER — PROGESTERONE 50 MG/ML
INJECTION, SOLUTION INTRAMUSCULAR
COMMUNITY
Start: 2022-01-04 | End: 2022-03-31

## 2022-01-11 RX ORDER — ASPIRIN 81 MG/1
TABLET, COATED ORAL
Status: ON HOLD | COMMUNITY
Start: 2022-01-07 | End: 2022-08-11

## 2022-01-11 RX ORDER — METFORMIN HYDROCHLORIDE 500 MG/1
TABLET, EXTENDED RELEASE ORAL
Status: ON HOLD | COMMUNITY
Start: 2021-12-09 | End: 2022-08-11

## 2022-01-12 DIAGNOSIS — Z03.818 ENCOUNTER FOR PATIENT CONCERN ABOUT EXPOSURE TO INFECTIOUS ORGANISM: ICD-10-CM

## 2022-01-12 NOTE — PROGRESS NOTES
"  Subjective:      27 y.o. female presents to urgent care for cold symptoms that started Sunday.  She is experiencing sore throat. No cough, headache, body aches, diarrhea, or fevers. She denies any tobacco product use.  No history of asthma or COPD.  She is fully vaccinated against COVID.  She has had some COVID positive contacts.  She needs a negative PCR before she can return to work.  She is approximately 8 weeks pregnant.    She denies any other questions or concerns at this time.    Current problem list, medication, and past medical/surgical history were reviewed in Epic.    ROS  See HPI     Objective:      /62   Pulse 71   Temp 36.7 °C (98 °F)   Resp 16   Ht 1.6 m (5' 3\")   Wt 57.1 kg (125 lb 12.8 oz)   SpO2 95%   BMI 22.28 kg/m²     Physical Exam  Constitutional:       General: She is not in acute distress.     Appearance: She is not diaphoretic.   HENT:      Mouth/Throat:      Pharynx: Uvula midline. No posterior oropharyngeal erythema.      Tonsils: No tonsillar exudate. 2+ on the right. 2+ on the left.   Cardiovascular:      Rate and Rhythm: Normal rate and regular rhythm.      Heart sounds: Normal heart sounds.   Pulmonary:      Effort: Pulmonary effort is normal. No respiratory distress.      Breath sounds: Normal breath sounds.   Neurological:      Mental Status: She is alert.   Psychiatric:         Mood and Affect: Affect normal.         Judgment: Judgment normal.       Assessment/Plan:     1. Encounter for patient concern about exposure to infectious organism  Testing performed for COVID-19. In the meantime patient was advised to isolate until COVID test results returned. I encouraged mask use, frequent handwashing, wiping down hard surfaces, etc. Tylenol and Ibuprofen were recommended for symptomatic relief. If positive they will be contacted by their local health department regarding return to work/school protocols. Patient is currently without indication of need for higher level of " care. Patient/Guardian was given precautionary signs/symptoms that mandate immediate follow up and evaluation in the ED. The patient and/or guardian demonstrated a good understanding and agreed with the treatment plan.  - SARS-CoV-2 PCR (24 hour In-House): Collect NP swab in VTM; Future      Instructed to return to Urgent Care or nearest Emergency Department if symptoms fail to improve, for any change in condition, further concerns, or new concerning symptoms. Patient states understanding of the plan of care and discharge instructions.    Lor Lackey M.D.

## 2022-01-14 LAB
SARS-COV-2 RNA RESP QL NAA+PROBE: NOTDETECTED
SPECIMEN SOURCE: NORMAL

## 2022-02-08 ENCOUNTER — INITIAL PRENATAL (OUTPATIENT)
Dept: OBGYN | Facility: CLINIC | Age: 27
End: 2022-02-08
Payer: COMMERCIAL

## 2022-02-08 ENCOUNTER — HOSPITAL ENCOUNTER (OUTPATIENT)
Facility: MEDICAL CENTER | Age: 27
End: 2022-02-08
Attending: OBSTETRICS & GYNECOLOGY
Payer: COMMERCIAL

## 2022-02-08 VITALS
BODY MASS INDEX: 22.52 KG/M2 | HEIGHT: 63 IN | SYSTOLIC BLOOD PRESSURE: 111 MMHG | DIASTOLIC BLOOD PRESSURE: 69 MMHG | WEIGHT: 127.1 LBS

## 2022-02-08 DIAGNOSIS — Z34.91 FIRST TRIMESTER PREGNANCY: ICD-10-CM

## 2022-02-08 PROCEDURE — 0501F PRENATAL FLOW SHEET: CPT | Performed by: OBSTETRICS & GYNECOLOGY

## 2022-02-08 PROCEDURE — 87591 N.GONORRHOEAE DNA AMP PROB: CPT

## 2022-02-08 PROCEDURE — 88175 CYTOPATH C/V AUTO FLUID REDO: CPT

## 2022-02-08 PROCEDURE — 87491 CHLMYD TRACH DNA AMP PROBE: CPT

## 2022-02-08 NOTE — PROGRESS NOTES
CC: New Ob visit    Subjective Ms. Nathalia Olson  IVF pregnancy transfer date 21 and DEANDRE 22. She denies any contractions/cramping, leakage of fluid, vaginal bleeding. She does not feel movement yet.    I have reviewed the patients' medical, surgical, gynecological, obstetrical, social, and family histories, medications and available lab results and pertinent notes are as follows:    OB History    Para Term  AB Living   2 0 0 0 1 0   SAB IAB Ectopic Molar Multiple Live Births   1 0 0 0 0 0       Past Gynecological History:    Denies fibroids, ovarian cysts, abnormal pap smears, STDs. She denies ever having surgery on her cervix, uterus, or ovaries in the past. Last pap smear was 2020.     History reviewed. No pertinent past medical history.    Past Surgical History:   Procedure Laterality Date   • BREAST RECONSTRUCTION         Social History     Socioeconomic History   • Marital status: Single     Spouse name: Not on file   • Number of children: Not on file   • Years of education: Not on file   • Highest education level: Not on file   Occupational History   • Not on file   Tobacco Use   • Smoking status: Never Smoker   • Smokeless tobacco: Never Used   Vaping Use   • Vaping Use: Never used   Substance and Sexual Activity   • Alcohol use: No     Alcohol/week: 0.0 oz   • Drug use: No   • Sexual activity: Yes     Partners: Male     Birth control/protection: Ring     Comment: Planned pregnancy.   Other Topics Concern   • Not on file   Social History Narrative   • Not on file     Social Determinants of Health     Financial Resource Strain:    • Difficulty of Paying Living Expenses: Not on file   Food Insecurity:    • Worried About Running Out of Food in the Last Year: Not on file   • Ran Out of Food in the Last Year: Not on file   Transportation Needs:    • Lack of Transportation (Medical): Not on file   • Lack of Transportation (Non-Medical): Not on file   Physical Activity:    • Days  of Exercise per Week: Not on file   • Minutes of Exercise per Session: Not on file   Stress:    • Feeling of Stress : Not on file   Social Connections:    • Frequency of Communication with Friends and Family: Not on file   • Frequency of Social Gatherings with Friends and Family: Not on file   • Attends Hoahaoism Services: Not on file   • Active Member of Clubs or Organizations: Not on file   • Attends Club or Organization Meetings: Not on file   • Marital Status: Not on file   Intimate Partner Violence:    • Fear of Current or Ex-Partner: Not on file   • Emotionally Abused: Not on file   • Physically Abused: Not on file   • Sexually Abused: Not on file   Housing Stability:    • Unable to Pay for Housing in the Last Year: Not on file   • Number of Places Lived in the Last Year: Not on file   • Unstable Housing in the Last Year: Not on file       Family History:   Family History   Problem Relation Age of Onset   • Hypertension Mother        Genetic Screening/Teratology Counseling- Includes patient, baby's father, or anyone in either family with:  Patient's age 35 years or older as of estimated date of delivery: No   Thalassemia (Italian, Greek, Mediterranean, or  background): MCV less than 80: No   Neural tube defect (Meningomyelocele, Spina bifida, or Anencephaly): No   Congenital heart defect: No   Down syndrome: No   Rex-Sachs (Ashkenazi Mu-ism, Cajun, Mohawk Macanese): No   Canavan disease (Ashkenazi Mu-ism): No   Familial dysautonomia (Ashkenazi Mu-ism): No   Sickle cell disease or trait (): No   Hemophilia or other blood disorders: No   Muscular dystrophy: No    Cystic fibrosis: No   Scipio's chorea: No   Mental retardation/autism: No   Other inherited genetic or chromosomal disorder: No   Maternal metabolic disorder (eg. Type 1 diabetes, PKU): No   Patient or baby's father had child with birth defects not listed above: No   Recurrent pregnancy loss, or a stillbirth: No             Infection  "Prevention  1. High Risk For HIV: No 6. Rash Or Illness Since LMP: No   2. High Risk For Hepatitis B or C: No 7. History Of STD, GC, Chlamydia, HPV Syphilis: No   3. Live With Someone With TB Or Exposed To TB: No 8. Have a cat in the home?: No   4. Patient Or Partner Has A History Of Herpes: No    5. History of Chicken Pox: No           Allergies: Patient has no known allergies.    Objective:/69   Ht 1.6 m (5' 3\")   Wt 57.7 kg (127 lb 1.6 oz)     Objective:   Vitals:    02/08/22 0753   BP: 111/69       Prenatal Physical:  General Exam:  HEENT: normal    Heart: normal    Thyroid: normal    Lungs: normal    Lymph Nodes: normal    Breasts: normal    Neurological: normal    Abdomen: normal    Skin: normal    Extremities: normal    Pelvic Exam:  Vulva:  Normal  Vagina:  Normal  Cervix:  Normal  Rectum:  Normal      Lab:   Recent Results (from the past 672 hour(s))   SARS-CoV-2 PCR (24 hour In-House): Collect NP swab in VTM    Collection Time: 01/11/22  7:01 PM    Specimen: Respirate   Result Value Ref Range    SARS-CoV-2 Source Nasal Swab     SARS-CoV-2 by PCR NotDetected        Ultrasound: Reviewed initial scan and DEANDRE 8/23/22 based off of IVF embryo transfer date.     Assessment:    --Normal Exam at 12 weeks    --IVF pregnancy- has appt with [facility] in 2 weeks. Discussed continuation of aspirin 81 mg daily as well as need for fetal echo around 22 to 23 weeks.      Plan:    Reviewed the patients risk factors for this pregnancy and recommend the need for routine prenatal screening- see above.     Genetic screening was discussed with literature on Prenatal Screening, Cystic Fibrosis, and SMA provided and the patient plans to - already did pre-implantation genetic screening.     Discuss importance of water intake, controlled caloric intake, eating 5 small meals throughout the day to maintain blood sugar and taking PNV    If has nausea, take Vitamin B6 25mg TID with doxylamine/Unisom 25mg at night    Discuss importance " of exercise, as well as rest    Lab slip for Prenatal labs (if not already completed)    Discuss policy for OB care at [facility]     Follow up in 4 weeks for next visit

## 2022-02-09 DIAGNOSIS — Z34.91 FIRST TRIMESTER PREGNANCY: ICD-10-CM

## 2022-03-04 ENCOUNTER — HOSPITAL ENCOUNTER (OUTPATIENT)
Dept: LAB | Facility: MEDICAL CENTER | Age: 27
End: 2022-03-04
Attending: OBSTETRICS & GYNECOLOGY
Payer: COMMERCIAL

## 2022-03-04 DIAGNOSIS — Z34.91 FIRST TRIMESTER PREGNANCY: ICD-10-CM

## 2022-03-04 LAB
ABO GROUP BLD: NORMAL
BASOPHILS # BLD AUTO: 0.5 % (ref 0–1.8)
BASOPHILS # BLD: 0.05 K/UL (ref 0–0.12)
BLD GP AB SCN SERPL QL: NORMAL
EOSINOPHIL # BLD AUTO: 0.28 K/UL (ref 0–0.51)
EOSINOPHIL NFR BLD: 2.7 % (ref 0–6.9)
ERYTHROCYTE [DISTWIDTH] IN BLOOD BY AUTOMATED COUNT: 42.9 FL (ref 35.9–50)
GLUCOSE 1H P 50 G GLC PO SERPL-MCNC: 115 MG/DL (ref 70–139)
HBV SURFACE AG SER QL: ABNORMAL
HCT VFR BLD AUTO: 38.8 % (ref 37–47)
HCV AB SER QL: ABNORMAL
HGB BLD-MCNC: 12.7 G/DL (ref 12–16)
HIV 1+2 AB+HIV1 P24 AG SERPL QL IA: NORMAL
IMM GRANULOCYTES # BLD AUTO: 0.04 K/UL (ref 0–0.11)
IMM GRANULOCYTES NFR BLD AUTO: 0.4 % (ref 0–0.9)
LYMPHOCYTES # BLD AUTO: 2.33 K/UL (ref 1–4.8)
LYMPHOCYTES NFR BLD: 22.2 % (ref 22–41)
MCH RBC QN AUTO: 29.3 PG (ref 27–33)
MCHC RBC AUTO-ENTMCNC: 32.7 G/DL (ref 33.6–35)
MCV RBC AUTO: 89.6 FL (ref 81.4–97.8)
MONOCYTES # BLD AUTO: 0.5 K/UL (ref 0–0.85)
MONOCYTES NFR BLD AUTO: 4.8 % (ref 0–13.4)
NEUTROPHILS # BLD AUTO: 7.29 K/UL (ref 2–7.15)
NEUTROPHILS NFR BLD: 69.4 % (ref 44–72)
NRBC # BLD AUTO: 0 K/UL
NRBC BLD-RTO: 0 /100 WBC
PLATELET # BLD AUTO: 298 K/UL (ref 164–446)
PMV BLD AUTO: 10.8 FL (ref 9–12.9)
RBC # BLD AUTO: 4.33 M/UL (ref 4.2–5.4)
RH BLD: NORMAL
RUBV AB SER QL: 72.5 IU/ML
T PALLIDUM AB SER QL IA: ABNORMAL
WBC # BLD AUTO: 10.5 K/UL (ref 4.8–10.8)

## 2022-03-04 PROCEDURE — 86850 RBC ANTIBODY SCREEN: CPT

## 2022-03-04 PROCEDURE — 87389 HIV-1 AG W/HIV-1&-2 AB AG IA: CPT

## 2022-03-04 PROCEDURE — 85025 COMPLETE CBC W/AUTO DIFF WBC: CPT

## 2022-03-04 PROCEDURE — 36415 COLL VENOUS BLD VENIPUNCTURE: CPT

## 2022-03-04 PROCEDURE — 81511 FTL CGEN ABNOR FOUR ANAL: CPT

## 2022-03-04 PROCEDURE — 87340 HEPATITIS B SURFACE AG IA: CPT

## 2022-03-04 PROCEDURE — 86803 HEPATITIS C AB TEST: CPT

## 2022-03-04 PROCEDURE — 86901 BLOOD TYPING SEROLOGIC RH(D): CPT

## 2022-03-04 PROCEDURE — 86592 SYPHILIS TEST NON-TREP QUAL: CPT

## 2022-03-04 PROCEDURE — 82950 GLUCOSE TEST: CPT

## 2022-03-04 PROCEDURE — 86900 BLOOD TYPING SEROLOGIC ABO: CPT

## 2022-03-04 PROCEDURE — 86762 RUBELLA ANTIBODY: CPT

## 2022-03-04 PROCEDURE — 80307 DRUG TEST PRSMV CHEM ANLYZR: CPT

## 2022-03-04 PROCEDURE — 86780 TREPONEMA PALLIDUM: CPT

## 2022-03-06 LAB
AMPHET CTO UR CFM-MCNC: NEGATIVE NG/ML
BARBITURATES CTO UR CFM-MCNC: NEGATIVE NG/ML
BENZODIAZ CTO UR CFM-MCNC: NEGATIVE NG/ML
CANNABINOIDS CTO UR CFM-MCNC: NEGATIVE NG/ML
COCAINE CTO UR CFM-MCNC: NEGATIVE NG/ML
DRUG COMMENT 753798: NORMAL
METHADONE CTO UR CFM-MCNC: NEGATIVE NG/ML
OPIATES CTO UR CFM-MCNC: NEGATIVE NG/ML
PCP CTO UR CFM-MCNC: NEGATIVE NG/ML
PROPOXYPH CTO UR CFM-MCNC: NEGATIVE NG/ML

## 2022-03-09 LAB
# FETUSES US: NORMAL
AFP MOM SERPL: 1.07
AFP SERPL-MCNC: 34 NG/ML
AGE - REPORTED: 28.6 YR
CURRENT SMOKER: NO
FAMILY MEMBER DISEASES HX: NO
GA METHOD: NORMAL
GA: NORMAL WK
HCG MOM SERPL: 2.38
HCG SERPL-ACNC: NORMAL IU/L
HX OF HEREDITARY DISORDERS: NO
IDDM PATIENT QL: NO
INHIBIN A MOM SERPL: 1.56
INHIBIN A SERPL-MCNC: 272 PG/ML
INTEGRATED SCN PATIENT-IMP: NORMAL
PATHOLOGY STUDY: NORMAL
SPECIMEN DRAWN SERPL: NORMAL
U ESTRIOL MOM SERPL: 1.43
U ESTRIOL SERPL-MCNC: 1.17 NG/ML

## 2022-03-10 ENCOUNTER — ROUTINE PRENATAL (OUTPATIENT)
Dept: OBGYN | Facility: CLINIC | Age: 27
End: 2022-03-10
Payer: COMMERCIAL

## 2022-03-10 VITALS — WEIGHT: 125.8 LBS | BODY MASS INDEX: 22.28 KG/M2 | SYSTOLIC BLOOD PRESSURE: 119 MMHG | DIASTOLIC BLOOD PRESSURE: 67 MMHG

## 2022-03-10 DIAGNOSIS — O09.812 PREGNANCY RESULTING FROM IN VITRO FERTILIZATION IN SECOND TRIMESTER: ICD-10-CM

## 2022-03-10 DIAGNOSIS — Z87.42 HISTORY OF PCOS: ICD-10-CM

## 2022-03-10 DIAGNOSIS — Z34.02 SUPERVISION OF NORMAL FIRST PREGNANCY IN SECOND TRIMESTER: Primary | ICD-10-CM

## 2022-03-10 PROBLEM — O09.819 PREGNANCY CONCEIVED THROUGH IN VITRO FERTILIZATION: Status: ACTIVE | Noted: 2022-03-10

## 2022-03-10 PROCEDURE — 0502F SUBSEQUENT PRENATAL CARE: CPT | Performed by: NURSE PRACTITIONER

## 2022-03-10 NOTE — NON-PROVIDER
Pt. Here for OB/FU. Reports Good FM.   Good # [phone]  Pt. Denies VB, LOF, or UC's.   Pharmacy verified.   Chaperone offered and not indicataed  Patient states no concerns at the moment.   U/S not yet scheduled

## 2022-03-10 NOTE — PROGRESS NOTES
No concerns today  Has US scheduled with [facility] in 3 weeks  Recent lab results reviewed, up to date  Did discuss need to repeat 1 hour glucose at 26-28 weeks  PTL/SAB precautions reviewed, all questions answered  COVID booster recommended    Umu Jose CNM, APRN

## 2022-04-08 ENCOUNTER — ROUTINE PRENATAL (OUTPATIENT)
Dept: OBGYN | Facility: CLINIC | Age: 27
End: 2022-04-08
Payer: COMMERCIAL

## 2022-04-08 VITALS — DIASTOLIC BLOOD PRESSURE: 71 MMHG | WEIGHT: 134 LBS | BODY MASS INDEX: 23.74 KG/M2 | SYSTOLIC BLOOD PRESSURE: 121 MMHG

## 2022-04-08 DIAGNOSIS — O44.40 LOW-LYING PLACENTA: ICD-10-CM

## 2022-04-08 DIAGNOSIS — O09.812 PREGNANCY RESULTING FROM IN VITRO FERTILIZATION IN SECOND TRIMESTER: ICD-10-CM

## 2022-04-08 PROCEDURE — 0502F SUBSEQUENT PRENATAL CARE: CPT | Performed by: NURSE PRACTITIONER

## 2022-04-08 NOTE — PROGRESS NOTES
SUBJECTIVE:  Pt is a 27 y.o.   at 20w4d  gestation. Presents today for follow-up prenatal care. Reports no issues at this time.  Reports  fetal movement. Denies regular cramping/contractions, bleeding or leaking of fluid. Denies dysuria, headaches, N/V. Generally feels well today.     OBJECTIVE:  - See prenatal vitals flow    ASSESSMENT:   - IUP at 20w4d    - S=D   -   Patient Active Problem List    Diagnosis Date Noted   • Pregnancy conceived through in vitro fertilization 03/10/2022   • History of PCOS 03/10/2022   • BILLY (generalized anxiety disorder) 2016         PLAN:  - S/sx pregnancy and labor warning signs vs general discomforts discussed  - Fetal movements and/or kick counts reviewed   - Adequate hydration reinforced  - Nutrition/exercise/vitamin education; continue PNV  - Saw [facility] and has follow up this month   - Reviewed low-lying placenta precautions which [facility] reviewed with her  - Reviewed COVID-19 vaccination recommendations: pt reports she does not think she wants to get the booster  - Anticipatory guidance given  - RTC in 4 weeks for follow-up prenatal care

## 2022-05-12 ENCOUNTER — ROUTINE PRENATAL (OUTPATIENT)
Dept: OBGYN | Facility: CLINIC | Age: 27
End: 2022-05-12
Payer: COMMERCIAL

## 2022-05-12 VITALS — WEIGHT: 141.1 LBS | BODY MASS INDEX: 24.99 KG/M2 | DIASTOLIC BLOOD PRESSURE: 66 MMHG | SYSTOLIC BLOOD PRESSURE: 106 MMHG

## 2022-05-12 DIAGNOSIS — Z34.93 THIRD TRIMESTER PREGNANCY: ICD-10-CM

## 2022-05-12 PROCEDURE — 90040 PR PRENATAL FOLLOW UP: CPT | Performed by: OBSTETRICS & GYNECOLOGY

## 2022-05-31 ENCOUNTER — APPOINTMENT (OUTPATIENT)
Dept: URGENT CARE | Facility: CLINIC | Age: 27
End: 2022-05-31
Payer: COMMERCIAL

## 2022-06-09 ENCOUNTER — ROUTINE PRENATAL (OUTPATIENT)
Dept: OBGYN | Facility: CLINIC | Age: 27
End: 2022-06-09
Payer: COMMERCIAL

## 2022-06-09 VITALS — BODY MASS INDEX: 25.97 KG/M2 | SYSTOLIC BLOOD PRESSURE: 119 MMHG | DIASTOLIC BLOOD PRESSURE: 66 MMHG | WEIGHT: 146.6 LBS

## 2022-06-09 DIAGNOSIS — Z34.83 ENCOUNTER FOR SUPERVISION OF OTHER NORMAL PREGNANCY IN THIRD TRIMESTER: ICD-10-CM

## 2022-06-09 PROCEDURE — 0502F SUBSEQUENT PRENATAL CARE: CPT | Performed by: NURSE PRACTITIONER

## 2022-06-09 NOTE — PROGRESS NOTES
SUBJECTIVE:  Pt is a 27 y.o.   at 29w3d  gestation. Presents today for follow-up prenatal care. Reports no issues at this time.  Reports good fetal movement. Denies regular cramping/contractions, bleeding or leaking of fluid. Denies dysuria, headaches, N/V. Generally feels well today.     OBJECTIVE:  - See prenatal vitals flow  -   Vitals:    22 1450   BP: 119/66   Weight: 66.5 kg (146 lb 9.6 oz)                 ASSESSMENT:   - IUP at 29w3d    - S=D   -   Patient Active Problem List    Diagnosis Date Noted   • Low-lying placenta 2022   • Pregnancy conceived through in vitro fertilization 03/10/2022   • History of PCOS 03/10/2022   • BILLY (generalized anxiety disorder) 2016         PLAN:  - S/sx pregnancy and labor warning signs vs general discomforts discussed  - Fetal movements and/or kick counts reviewed   - Adequate hydration reinforced  - Nutrition/exercise/vitamin education; continue PNV  - Will plan on tdap next appt  - Third tri labs ordered  - FKC sheet given  - Has follow up with [facility] on   - Anticipatory guidance given  - RTC in 2 weeks for follow-up prenatal care

## 2022-06-09 NOTE — LETTER
"Count Your Baby's Movements  Another step to a healthy delivery    Nathalia Olson             Dept: [phone]    How Many Weeks Pregnant:29w3d    Date to Begin Countin2022              How to use this chart    One way for your physician to keep track of your baby's health is by knowing how often the baby moves (or \"kicks\") in your womb.  You can help your physician to do this by using this chart every day.    Every day, you should see how many hours it takes for your baby to move 10 times.  Start in the morning, as soon as you get up.    · First, write down the time your baby moves until you get to 10.  · Check off one box every time your baby moves until you get to 10.  · Write down the time you finished counting in the last column.  · Total how long it took to count up all 10 movements.  · Finally, fill in the box that shows how long this took.  After counting 10 movements, you no longer have to count any more that day.  The next morning, just start counting again as soon as you get up.    What should you call a \"movement\"?  It is hard to say, because it will feel different from one mother to another and from one pregnancy to the next.  The important thing is that you count the movements the same way throughout your pregnancy.  If you have more questions, you should ask your physician.    Count carefully every day!  SAMPLE:  Week 28    How many hours did it take to feel 10 movements?       Start  Time     1     2     3     4     5     6     7     8     9     10   Finish Time   Mon 8:20 ·  ·  ·  ·  ·  ·  ·  ·  ·  ·  11:40                  Sat               Sun                 IMPORTANT: You should contact your physician if it takes more than two hours for you to feel 10 movements.  Each morning, write down the time and start to count the movements of your baby.  Keep track by checking off one box every time you feel one movement.  When you have felt 10 " "\"kicks\", write down the time you finished counting in the last column.  Then fill in the   box (over the check doni) for the number of hours it took.  Be sure to read the complete instructions on the previous page.            "

## 2022-06-17 LAB
ERYTHROCYTE [DISTWIDTH] IN BLOOD BY AUTOMATED COUNT: 13.4 % (ref 11.7–15.4)
GLUCOSE 1H P 50 G GLC PO SERPL-MCNC: 147 MG/DL (ref 65–139)
HCT VFR BLD AUTO: 37.1 % (ref 34–46.6)
HGB BLD-MCNC: 12.4 G/DL (ref 11.1–15.9)
MCH RBC QN AUTO: 29.7 PG (ref 26.6–33)
MCHC RBC AUTO-ENTMCNC: 33.4 G/DL (ref 31.5–35.7)
MCV RBC AUTO: 89 FL (ref 79–97)
NRBC BLD AUTO-RTO: NORMAL %
PLATELET # BLD AUTO: 287 X10E3/UL (ref 150–450)
RBC # BLD AUTO: 4.18 X10E6/UL (ref 3.77–5.28)
TREPONEMA PALLIDUM IGG+IGM AB [PRESENCE] IN SERUM OR PLASMA BY IMMUNOASSAY: NON REACTIVE
WBC # BLD AUTO: 8.9 X10E3/UL (ref 3.4–10.8)

## 2022-06-21 ENCOUNTER — ROUTINE PRENATAL (OUTPATIENT)
Dept: OBGYN | Facility: CLINIC | Age: 27
End: 2022-06-21
Payer: COMMERCIAL

## 2022-06-21 VITALS
WEIGHT: 146.4 LBS | HEIGHT: 63 IN | SYSTOLIC BLOOD PRESSURE: 111 MMHG | DIASTOLIC BLOOD PRESSURE: 72 MMHG | BODY MASS INDEX: 25.94 KG/M2

## 2022-06-21 DIAGNOSIS — Z23 NEED FOR TDAP VACCINATION: ICD-10-CM

## 2022-06-21 DIAGNOSIS — Z34.83 PRENATAL CARE, SUBSEQUENT PREGNANCY IN THIRD TRIMESTER: Primary | ICD-10-CM

## 2022-06-21 DIAGNOSIS — O99.810 ABNORMAL O'SULLIVAN GLUCOSE CHALLENGE TEST, ANTEPARTUM: ICD-10-CM

## 2022-06-21 PROBLEM — O44.40 LOW-LYING PLACENTA: Status: RESOLVED | Noted: 2022-04-08 | Resolved: 2022-06-21

## 2022-06-21 PROCEDURE — 90715 TDAP VACCINE 7 YRS/> IM: CPT | Performed by: OBSTETRICS & GYNECOLOGY

## 2022-06-21 PROCEDURE — 90471 IMMUNIZATION ADMIN: CPT | Performed by: OBSTETRICS & GYNECOLOGY

## 2022-06-21 PROCEDURE — 0502F SUBSEQUENT PRENATAL CARE: CPT | Performed by: OBSTETRICS & GYNECOLOGY

## 2022-06-22 DIAGNOSIS — Z34.93 THIRD TRIMESTER PREGNANCY: ICD-10-CM

## 2022-06-29 ENCOUNTER — TELEPHONE (OUTPATIENT)
Dept: OBGYN | Facility: CLINIC | Age: 27
End: 2022-06-29
Payer: COMMERCIAL

## 2022-07-01 LAB
GLUCOSE 1H P 100 G GLC PO SERPL-MCNC: 151 MG/DL (ref 65–179)
GLUCOSE 2H P 100 G GLC PO SERPL-MCNC: 147 MG/DL (ref 65–154)
GLUCOSE 3H P 100 G GLC PO SERPL-MCNC: 64 MG/DL (ref 65–139)
GLUCOSE P FAST SERPL-MCNC: 84 MG/DL (ref 65–94)
NOTE   102047: ABNORMAL

## 2022-07-05 ENCOUNTER — ROUTINE PRENATAL (OUTPATIENT)
Dept: OBGYN | Facility: CLINIC | Age: 27
End: 2022-07-05
Payer: COMMERCIAL

## 2022-07-05 VITALS — SYSTOLIC BLOOD PRESSURE: 123 MMHG | DIASTOLIC BLOOD PRESSURE: 81 MMHG | BODY MASS INDEX: 26.62 KG/M2 | WEIGHT: 150.3 LBS

## 2022-07-05 DIAGNOSIS — Z34.93 THIRD TRIMESTER PREGNANCY: ICD-10-CM

## 2022-07-05 PROCEDURE — 0502F SUBSEQUENT PRENATAL CARE: CPT | Performed by: OBSTETRICS & GYNECOLOGY

## 2022-07-10 ENCOUNTER — HOSPITAL ENCOUNTER (EMERGENCY)
Facility: MEDICAL CENTER | Age: 27
End: 2022-07-10
Attending: ANESTHESIOLOGY | Admitting: ANESTHESIOLOGY
Payer: COMMERCIAL

## 2022-07-10 ENCOUNTER — APPOINTMENT (OUTPATIENT)
Dept: RADIOLOGY | Facility: MEDICAL CENTER | Age: 27
End: 2022-07-10
Attending: OBSTETRICS & GYNECOLOGY
Payer: COMMERCIAL

## 2022-07-10 VITALS
HEIGHT: 63 IN | BODY MASS INDEX: 27.42 KG/M2 | DIASTOLIC BLOOD PRESSURE: 82 MMHG | OXYGEN SATURATION: 95 % | HEART RATE: 75 BPM | RESPIRATION RATE: 18 BRPM | WEIGHT: 154.76 LBS | TEMPERATURE: 97.8 F | SYSTOLIC BLOOD PRESSURE: 137 MMHG

## 2022-07-10 DIAGNOSIS — T59.811A SMOKE INHALATION: ICD-10-CM

## 2022-07-10 LAB
A1 MICROGLOB PLACENTAL VAG QL: NEGATIVE
FLUAV RNA SPEC QL NAA+PROBE: NEGATIVE
FLUBV RNA SPEC QL NAA+PROBE: NEGATIVE
RSV RNA SPEC QL NAA+PROBE: NEGATIVE
SARS-COV-2 RNA RESP QL NAA+PROBE: NOTDETECTED
SPECIMEN SOURCE: NORMAL

## 2022-07-10 PROCEDURE — 59025 FETAL NON-STRESS TEST: CPT | Mod: 26

## 2022-07-10 PROCEDURE — 700101 HCHG RX REV CODE 250

## 2022-07-10 PROCEDURE — 94640 AIRWAY INHALATION TREATMENT: CPT

## 2022-07-10 PROCEDURE — 0241U HCHG SARS-COV-2 COVID-19 NFCT DS RESP RNA 4 TRGT MIC: CPT

## 2022-07-10 PROCEDURE — 76819 FETAL BIOPHYS PROFIL W/O NST: CPT

## 2022-07-10 PROCEDURE — 84112 EVAL AMNIOTIC FLUID PROTEIN: CPT

## 2022-07-10 PROCEDURE — 94760 N-INVAS EAR/PLS OXIMETRY 1: CPT

## 2022-07-10 PROCEDURE — C9803 HOPD COVID-19 SPEC COLLECT: HCPCS | Performed by: EMERGENCY MEDICINE

## 2022-07-10 PROCEDURE — 59025 FETAL NON-STRESS TEST: CPT

## 2022-07-10 PROCEDURE — 99284 EMERGENCY DEPT VISIT MOD MDM: CPT

## 2022-07-10 PROCEDURE — 99283 EMERGENCY DEPT VISIT LOW MDM: CPT | Mod: 25

## 2022-07-10 RX ORDER — ALBUTEROL SULFATE 2.5 MG/3ML
2.5 SOLUTION RESPIRATORY (INHALATION) ONCE
Status: DISCONTINUED | OUTPATIENT
Start: 2022-07-10 | End: 2022-07-10 | Stop reason: HOSPADM

## 2022-07-10 RX ORDER — ALBUTEROL SULFATE 2.5 MG/3ML
SOLUTION RESPIRATORY (INHALATION)
Status: COMPLETED
Start: 2022-07-10 | End: 2022-07-10

## 2022-07-10 RX ADMIN — ALBUTEROL SULFATE 2.5 MG: 2.5 SOLUTION RESPIRATORY (INHALATION) at 07:45
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
## 2022-07-10 NOTE — ED NOTES
PT ambulated without assistance to room. PT in gown and on monitor with call light in reach. Chart up for next available ERP. PT states she had covid 3 weeks ago; symptoms had resolved. Agree with triage note.
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
## 2022-07-12 ENCOUNTER — TELEPHONE (OUTPATIENT)
Dept: OBGYN | Facility: CLINIC | Age: 27
End: 2022-07-12
Payer: COMMERCIAL

## 2022-07-12 NOTE — TELEPHONE ENCOUNTER
LVMTCB to have patient come in at 9:30 am rather than 9:15 am due to the provider having a meeting during that time.

## 2022-07-18 ENCOUNTER — ROUTINE PRENATAL (OUTPATIENT)
Dept: OBGYN | Facility: CLINIC | Age: 27
End: 2022-07-18
Payer: COMMERCIAL

## 2022-07-18 VITALS — BODY MASS INDEX: 27.28 KG/M2 | SYSTOLIC BLOOD PRESSURE: 128 MMHG | DIASTOLIC BLOOD PRESSURE: 83 MMHG | WEIGHT: 154 LBS

## 2022-07-18 DIAGNOSIS — O09.813 PREGNANCY RESULTING FROM IN VITRO FERTILIZATION IN THIRD TRIMESTER: ICD-10-CM

## 2022-07-18 DIAGNOSIS — R05.9 COUGH: ICD-10-CM

## 2022-07-18 PROCEDURE — 0502F SUBSEQUENT PRENATAL CARE: CPT | Performed by: OBSTETRICS & GYNECOLOGY

## 2022-07-18 RX ORDER — BENZONATATE 100 MG/1
100 CAPSULE ORAL 3 TIMES DAILY PRN
Qty: 60 CAPSULE | Refills: 0 | Status: SHIPPED | OUTPATIENT
Start: 2022-07-18 | End: 2022-07-18

## 2022-07-18 RX ORDER — BENZONATATE 100 MG/1
100 CAPSULE ORAL 3 TIMES DAILY PRN
Qty: 30 CAPSULE | Refills: 0 | Status: ON HOLD | OUTPATIENT
Start: 2022-07-18 | End: 2022-08-09

## 2022-07-18 NOTE — PROGRESS NOTES
Pt doing well, no OB complaints. Pt reports she does have a significant cough. She has been sick for about 3 weeks now. She had a fever when it first started but not other symptoms since. Has testing neg for COVID. Trying OTC coungh medicines and cough drops with little relief. She reports sometimes her teeth hurt.   -No definitive signs of sinus infection today. Pt aware if symptoms worsen she should go to urgent care for further evaluation.Tessalong Pearls Rx today to aid in cough.  -PTL and FKC reviewed  -RTC in1  Week  -GBS next appt.   -Cont f/u with [facility] as scheduled

## 2022-07-27 ENCOUNTER — HOSPITAL ENCOUNTER (OUTPATIENT)
Facility: MEDICAL CENTER | Age: 27
End: 2022-07-27
Attending: OBSTETRICS & GYNECOLOGY
Payer: COMMERCIAL

## 2022-07-27 ENCOUNTER — ROUTINE PRENATAL (OUTPATIENT)
Dept: OBGYN | Facility: CLINIC | Age: 27
End: 2022-07-27
Payer: COMMERCIAL

## 2022-07-27 VITALS — BODY MASS INDEX: 27 KG/M2 | WEIGHT: 152.4 LBS | SYSTOLIC BLOOD PRESSURE: 124 MMHG | DIASTOLIC BLOOD PRESSURE: 76 MMHG

## 2022-07-27 DIAGNOSIS — R05.9 COUGH: ICD-10-CM

## 2022-07-27 DIAGNOSIS — Z34.93 THIRD TRIMESTER PREGNANCY: ICD-10-CM

## 2022-07-27 PROCEDURE — 87150 DNA/RNA AMPLIFIED PROBE: CPT

## 2022-07-27 PROCEDURE — 87081 CULTURE SCREEN ONLY: CPT

## 2022-07-27 PROCEDURE — 0502F SUBSEQUENT PRENATAL CARE: CPT | Performed by: OBSTETRICS & GYNECOLOGY

## 2022-07-27 RX ORDER — ALBUTEROL SULFATE 2.5 MG/3ML
2.5 SOLUTION RESPIRATORY (INHALATION) EVERY 4 HOURS PRN
Qty: 3 ML | Refills: 2 | Status: ON HOLD | OUTPATIENT
Start: 2022-07-27 | End: 2022-08-09

## 2022-07-29 LAB — GP B STREP DNA SPEC QL NAA+PROBE: POSITIVE

## 2022-08-04 ENCOUNTER — ROUTINE PRENATAL (OUTPATIENT)
Dept: OBGYN | Facility: CLINIC | Age: 27
End: 2022-08-04
Payer: COMMERCIAL

## 2022-08-04 VITALS — WEIGHT: 152 LBS | DIASTOLIC BLOOD PRESSURE: 94 MMHG | SYSTOLIC BLOOD PRESSURE: 138 MMHG | BODY MASS INDEX: 26.93 KG/M2

## 2022-08-04 DIAGNOSIS — Z34.83 ENCOUNTER FOR SUPERVISION OF OTHER NORMAL PREGNANCY, THIRD TRIMESTER: ICD-10-CM

## 2022-08-04 PROCEDURE — 90040 PR PRENATAL FOLLOW UP: CPT | Performed by: ADVANCED PRACTICE MIDWIFE

## 2022-08-04 NOTE — NON-PROVIDER
Pt here today for OB follow up  Pt states  Reports +  Good # [phone]  Pharmacy Confirmed.  Chaperone offered and not indicated.   GBS positive

## 2022-08-05 NOTE — PROGRESS NOTES
Patient here for RENU visit. Affirms fetal movement, denies vagina bleeding or cramping. Patient slipped in garage on way into appointment but did not fall. /94 on repeat. Patient without any symptoms. [facility] this appointment[facility]   Strong preeclampsia precautions reviewed with patient today and she voices understanding. At this time, will send request for IOL 39-40 weeks. Briefly reviewed ARRIVE trial. GBS  Positive status reviewed with patient.

## 2022-08-06 ENCOUNTER — DOCUMENTATION (OUTPATIENT)
Dept: OBGYN | Facility: CLINIC | Age: 27
End: 2022-08-06
Payer: COMMERCIAL

## 2022-08-07 NOTE — PROGRESS NOTES
Patient seen by [facility] 30 minutes following her [facility] appointment on 8/4/2022. Her BP at that time was 129/82 per notes in media.     Patient counseled on IOL at 39 weeks due to IVF status and growth at 10th percentile. While it did not represent FGR, Dr. Johnson felt that delivery at 39 weeks would be appropriate due to IVF status. Previously sent request for 39-40 weeks. Will change to 8/15 or 8/16 specifically. She will follow up at next visit regarding date and time.

## 2022-08-09 ENCOUNTER — ANESTHESIA (OUTPATIENT)
Dept: ANESTHESIOLOGY | Facility: MEDICAL CENTER | Age: 27
End: 2022-08-09
Payer: COMMERCIAL

## 2022-08-09 ENCOUNTER — HOSPITAL ENCOUNTER (INPATIENT)
Facility: MEDICAL CENTER | Age: 27
LOS: 2 days | End: 2022-08-11
Attending: OBSTETRICS & GYNECOLOGY | Admitting: OBSTETRICS & GYNECOLOGY
Payer: COMMERCIAL

## 2022-08-09 ENCOUNTER — ANESTHESIA EVENT (OUTPATIENT)
Dept: ANESTHESIOLOGY | Facility: MEDICAL CENTER | Age: 27
End: 2022-08-09
Payer: COMMERCIAL

## 2022-08-09 DIAGNOSIS — R63.39 BREAST FEEDING PROBLEM IN INFANT: ICD-10-CM

## 2022-08-09 LAB
ABO GROUP BLD: NORMAL
ALBUMIN SERPL BCP-MCNC: 3.6 G/DL (ref 3.2–4.9)
ALBUMIN/GLOB SERPL: 1.1 G/DL
ALP SERPL-CCNC: 138 U/L (ref 30–99)
ALT SERPL-CCNC: 10 U/L (ref 2–50)
ANION GAP SERPL CALC-SCNC: 13 MMOL/L (ref 7–16)
AST SERPL-CCNC: 21 U/L (ref 12–45)
BASOPHILS # BLD AUTO: 0.2 % (ref 0–1.8)
BASOPHILS # BLD: 0.02 K/UL (ref 0–0.12)
BILIRUB SERPL-MCNC: 0.4 MG/DL (ref 0.1–1.5)
BLD GP AB SCN SERPL QL: NORMAL
BUN SERPL-MCNC: 8 MG/DL (ref 8–22)
CALCIUM SERPL-MCNC: 9.1 MG/DL (ref 8.5–10.5)
CHLORIDE SERPL-SCNC: 104 MMOL/L (ref 96–112)
CO2 SERPL-SCNC: 19 MMOL/L (ref 20–33)
CREAT SERPL-MCNC: 0.44 MG/DL (ref 0.5–1.4)
CREAT UR-MCNC: 25.47 MG/DL
EOSINOPHIL # BLD AUTO: 0.17 K/UL (ref 0–0.51)
EOSINOPHIL NFR BLD: 1.3 % (ref 0–6.9)
ERYTHROCYTE [DISTWIDTH] IN BLOOD BY AUTOMATED COUNT: 42.3 FL (ref 35.9–50)
GFR SERPLBLD CREATININE-BSD FMLA CKD-EPI: 135 ML/MIN/1.73 M 2
GLOBULIN SER CALC-MCNC: 3.2 G/DL (ref 1.9–3.5)
GLUCOSE SERPL-MCNC: 85 MG/DL (ref 65–99)
HCT VFR BLD AUTO: 38 % (ref 37–47)
HGB BLD-MCNC: 12.3 G/DL (ref 12–16)
IMM GRANULOCYTES # BLD AUTO: 0.06 K/UL (ref 0–0.11)
IMM GRANULOCYTES NFR BLD AUTO: 0.5 % (ref 0–0.9)
LYMPHOCYTES # BLD AUTO: 1.96 K/UL (ref 1–4.8)
LYMPHOCYTES NFR BLD: 15.5 % (ref 22–41)
MAGNESIUM SERPL-MCNC: 1.8 MG/DL (ref 1.5–2.5)
MAGNESIUM SERPL-MCNC: 5.4 MG/DL (ref 1.5–2.5)
MCH RBC QN AUTO: 27.8 PG (ref 27–33)
MCHC RBC AUTO-ENTMCNC: 32.4 G/DL (ref 33.6–35)
MCV RBC AUTO: 86 FL (ref 81.4–97.8)
MONOCYTES # BLD AUTO: 0.72 K/UL (ref 0–0.85)
MONOCYTES NFR BLD AUTO: 5.7 % (ref 0–13.4)
NEUTROPHILS # BLD AUTO: 9.74 K/UL (ref 2–7.15)
NEUTROPHILS NFR BLD: 76.8 % (ref 44–72)
NRBC # BLD AUTO: 0 K/UL
NRBC BLD-RTO: 0 /100 WBC
PLATELET # BLD AUTO: 271 K/UL (ref 164–446)
PMV BLD AUTO: 11.7 FL (ref 9–12.9)
POTASSIUM SERPL-SCNC: 4 MMOL/L (ref 3.6–5.5)
PROT SERPL-MCNC: 6.8 G/DL (ref 6–8.2)
PROT UR-MCNC: 11 MG/DL (ref 0–15)
PROT/CREAT UR: 432 MG/G (ref 10–107)
RBC # BLD AUTO: 4.42 M/UL (ref 4.2–5.4)
RH BLD: NORMAL
SODIUM SERPL-SCNC: 136 MMOL/L (ref 135–145)
WBC # BLD AUTO: 12.7 K/UL (ref 4.8–10.8)

## 2022-08-09 PROCEDURE — A9270 NON-COVERED ITEM OR SERVICE: HCPCS

## 2022-08-09 PROCEDURE — 59409 OBSTETRICAL CARE: CPT

## 2022-08-09 PROCEDURE — A9270 NON-COVERED ITEM OR SERVICE: HCPCS | Performed by: OBSTETRICS & GYNECOLOGY

## 2022-08-09 PROCEDURE — 36415 COLL VENOUS BLD VENIPUNCTURE: CPT

## 2022-08-09 PROCEDURE — 80053 COMPREHEN METABOLIC PANEL: CPT

## 2022-08-09 PROCEDURE — 0KQM0ZZ REPAIR PERINEUM MUSCLE, OPEN APPROACH: ICD-10-PCS | Performed by: OBSTETRICS & GYNECOLOGY

## 2022-08-09 PROCEDURE — 10907ZC DRAINAGE OF AMNIOTIC FLUID, THERAPEUTIC FROM PRODUCTS OF CONCEPTION, VIA NATURAL OR ARTIFICIAL OPENING: ICD-10-PCS | Performed by: OBSTETRICS & GYNECOLOGY

## 2022-08-09 PROCEDURE — 700111 HCHG RX REV CODE 636 W/ 250 OVERRIDE (IP)

## 2022-08-09 PROCEDURE — 59025 FETAL NON-STRESS TEST: CPT | Performed by: OBSTETRICS & GYNECOLOGY

## 2022-08-09 PROCEDURE — 82570 ASSAY OF URINE CREATININE: CPT

## 2022-08-09 PROCEDURE — 85025 COMPLETE CBC W/AUTO DIFF WBC: CPT

## 2022-08-09 PROCEDURE — 700102 HCHG RX REV CODE 250 W/ 637 OVERRIDE(OP): Performed by: OBSTETRICS & GYNECOLOGY

## 2022-08-09 PROCEDURE — 700101 HCHG RX REV CODE 250: Performed by: ANESTHESIOLOGY

## 2022-08-09 PROCEDURE — 700102 HCHG RX REV CODE 250 W/ 637 OVERRIDE(OP)

## 2022-08-09 PROCEDURE — 86850 RBC ANTIBODY SCREEN: CPT

## 2022-08-09 PROCEDURE — 99285 EMERGENCY DEPT VISIT HI MDM: CPT

## 2022-08-09 PROCEDURE — 86901 BLOOD TYPING SEROLOGIC RH(D): CPT

## 2022-08-09 PROCEDURE — 700101 HCHG RX REV CODE 250

## 2022-08-09 PROCEDURE — 770002 HCHG ROOM/CARE - OB PRIVATE (112)

## 2022-08-09 PROCEDURE — 700111 HCHG RX REV CODE 636 W/ 250 OVERRIDE (IP): Performed by: OBSTETRICS & GYNECOLOGY

## 2022-08-09 PROCEDURE — 01967 NEURAXL LBR ANES VAG DLVR: CPT | Performed by: ANESTHESIOLOGY

## 2022-08-09 PROCEDURE — 700105 HCHG RX REV CODE 258: Performed by: OBSTETRICS & GYNECOLOGY

## 2022-08-09 PROCEDURE — 84156 ASSAY OF PROTEIN URINE: CPT

## 2022-08-09 PROCEDURE — 700111 HCHG RX REV CODE 636 W/ 250 OVERRIDE (IP): Performed by: ANESTHESIOLOGY

## 2022-08-09 PROCEDURE — 303615 HCHG EPIDURAL/SPINAL ANESTHESIA FOR LABOR

## 2022-08-09 PROCEDURE — 59400 OBSTETRICAL CARE: CPT | Mod: MISDOCU | Performed by: OBSTETRICS & GYNECOLOGY

## 2022-08-09 PROCEDURE — 83735 ASSAY OF MAGNESIUM: CPT

## 2022-08-09 PROCEDURE — 86900 BLOOD TYPING SEROLOGIC ABO: CPT

## 2022-08-09 PROCEDURE — 304965 HCHG RECOVERY SERVICES

## 2022-08-09 RX ORDER — OXYTOCIN 10 [USP'U]/ML
10 INJECTION, SOLUTION INTRAMUSCULAR; INTRAVENOUS
Status: DISCONTINUED | OUTPATIENT
Start: 2022-08-09 | End: 2022-08-09 | Stop reason: HOSPADM

## 2022-08-09 RX ORDER — CALCIUM GLUCONATE 94 MG/ML
1 INJECTION, SOLUTION INTRAVENOUS
Status: DISCONTINUED | OUTPATIENT
Start: 2022-08-09 | End: 2022-08-09 | Stop reason: HOSPADM

## 2022-08-09 RX ORDER — MISOPROSTOL 200 UG/1
TABLET ORAL
Status: COMPLETED
Start: 2022-08-09 | End: 2022-08-09

## 2022-08-09 RX ORDER — TERBUTALINE SULFATE 1 MG/ML
0.25 INJECTION, SOLUTION SUBCUTANEOUS
Status: DISCONTINUED | OUTPATIENT
Start: 2022-08-09 | End: 2022-08-09 | Stop reason: HOSPADM

## 2022-08-09 RX ORDER — ROPIVACAINE HYDROCHLORIDE 2 MG/ML
INJECTION, SOLUTION EPIDURAL; INFILTRATION; PERINEURAL CONTINUOUS
Status: DISCONTINUED | OUTPATIENT
Start: 2022-08-09 | End: 2022-08-09

## 2022-08-09 RX ORDER — SODIUM CHLORIDE, SODIUM LACTATE, POTASSIUM CHLORIDE, CALCIUM CHLORIDE 600; 310; 30; 20 MG/100ML; MG/100ML; MG/100ML; MG/100ML
INJECTION, SOLUTION INTRAVENOUS PRN
Status: DISCONTINUED | OUTPATIENT
Start: 2022-08-09 | End: 2022-08-11 | Stop reason: HOSPADM

## 2022-08-09 RX ORDER — SODIUM CHLORIDE 9 MG/ML
INJECTION, SOLUTION INTRAVENOUS
Status: ACTIVE
Start: 2022-08-09 | End: 2022-08-10

## 2022-08-09 RX ORDER — TRANEXAMIC ACID 100 MG/ML
INJECTION, SOLUTION INTRAVENOUS
Status: COMPLETED
Start: 2022-08-09 | End: 2022-08-09

## 2022-08-09 RX ORDER — CALCIUM GLUCONATE 94 MG/ML
1 INJECTION, SOLUTION INTRAVENOUS
Status: DISCONTINUED | OUTPATIENT
Start: 2022-08-09 | End: 2022-08-11 | Stop reason: HOSPADM

## 2022-08-09 RX ORDER — MAGNESIUM SULFATE HEPTAHYDRATE 40 MG/ML
4 INJECTION, SOLUTION INTRAVENOUS ONCE
Status: COMPLETED | OUTPATIENT
Start: 2022-08-09 | End: 2022-08-09

## 2022-08-09 RX ORDER — ROPIVACAINE HYDROCHLORIDE 2 MG/ML
INJECTION, SOLUTION EPIDURAL; INFILTRATION; PERINEURAL CONTINUOUS
Status: DISCONTINUED | OUTPATIENT
Start: 2022-08-09 | End: 2022-08-09 | Stop reason: HOSPADM

## 2022-08-09 RX ORDER — BUPIVACAINE HYDROCHLORIDE 2.5 MG/ML
INJECTION, SOLUTION EPIDURAL; INFILTRATION; INTRACAUDAL
Status: COMPLETED | OUTPATIENT
Start: 2022-08-09 | End: 2022-08-09

## 2022-08-09 RX ORDER — BUPIVACAINE HYDROCHLORIDE 2.5 MG/ML
INJECTION, SOLUTION EPIDURAL; INFILTRATION; INTRACAUDAL
Status: COMPLETED
Start: 2022-08-09 | End: 2022-08-09

## 2022-08-09 RX ORDER — OXYCODONE HYDROCHLORIDE 5 MG/1
5 TABLET ORAL EVERY 4 HOURS PRN
Status: DISCONTINUED | OUTPATIENT
Start: 2022-08-09 | End: 2022-08-09

## 2022-08-09 RX ORDER — DOCUSATE SODIUM 100 MG/1
100 CAPSULE, LIQUID FILLED ORAL 2 TIMES DAILY PRN
Status: DISCONTINUED | OUTPATIENT
Start: 2022-08-09 | End: 2022-08-11 | Stop reason: HOSPADM

## 2022-08-09 RX ORDER — SODIUM CHLORIDE, SODIUM LACTATE, POTASSIUM CHLORIDE, CALCIUM CHLORIDE 600; 310; 30; 20 MG/100ML; MG/100ML; MG/100ML; MG/100ML
INJECTION, SOLUTION INTRAVENOUS CONTINUOUS
Status: DISCONTINUED | OUTPATIENT
Start: 2022-08-09 | End: 2022-08-11 | Stop reason: HOSPADM

## 2022-08-09 RX ORDER — ROPIVACAINE HYDROCHLORIDE 2 MG/ML
INJECTION, SOLUTION EPIDURAL; INFILTRATION; PERINEURAL
Status: COMPLETED
Start: 2022-08-09 | End: 2022-08-09

## 2022-08-09 RX ORDER — OXYCODONE HYDROCHLORIDE 5 MG/1
5 TABLET ORAL EVERY 4 HOURS PRN
Status: DISCONTINUED | OUTPATIENT
Start: 2022-08-09 | End: 2022-08-11 | Stop reason: HOSPADM

## 2022-08-09 RX ORDER — SODIUM CHLORIDE, SODIUM LACTATE, POTASSIUM CHLORIDE, AND CALCIUM CHLORIDE .6; .31; .03; .02 G/100ML; G/100ML; G/100ML; G/100ML
250 INJECTION, SOLUTION INTRAVENOUS PRN
Status: DISCONTINUED | OUTPATIENT
Start: 2022-08-09 | End: 2022-08-09 | Stop reason: HOSPADM

## 2022-08-09 RX ORDER — LIDOCAINE HYDROCHLORIDE AND EPINEPHRINE 15; 5 MG/ML; UG/ML
INJECTION, SOLUTION EPIDURAL
Status: COMPLETED | OUTPATIENT
Start: 2022-08-09 | End: 2022-08-09

## 2022-08-09 RX ORDER — IBUPROFEN 800 MG/1
800 TABLET ORAL
Status: COMPLETED | OUTPATIENT
Start: 2022-08-09 | End: 2022-08-09

## 2022-08-09 RX ORDER — IBUPROFEN 800 MG/1
800 TABLET ORAL EVERY 8 HOURS PRN
Status: DISCONTINUED | OUTPATIENT
Start: 2022-08-09 | End: 2022-08-11 | Stop reason: HOSPADM

## 2022-08-09 RX ORDER — SODIUM CHLORIDE, SODIUM LACTATE, POTASSIUM CHLORIDE, AND CALCIUM CHLORIDE .6; .31; .03; .02 G/100ML; G/100ML; G/100ML; G/100ML
500 INJECTION, SOLUTION INTRAVENOUS ONCE
Status: COMPLETED | OUTPATIENT
Start: 2022-08-09 | End: 2022-08-09

## 2022-08-09 RX ORDER — ACETAMINOPHEN 500 MG
1000 TABLET ORAL EVERY 6 HOURS PRN
Status: DISCONTINUED | OUTPATIENT
Start: 2022-08-09 | End: 2022-08-11 | Stop reason: HOSPADM

## 2022-08-09 RX ORDER — MISOPROSTOL 200 UG/1
1000 TABLET ORAL ONCE
Status: COMPLETED | OUTPATIENT
Start: 2022-08-09 | End: 2022-08-09

## 2022-08-09 RX ORDER — ACETAMINOPHEN 500 MG
1000 TABLET ORAL
Status: DISCONTINUED | OUTPATIENT
Start: 2022-08-09 | End: 2022-08-09 | Stop reason: HOSPADM

## 2022-08-09 RX ORDER — SODIUM CHLORIDE, SODIUM LACTATE, POTASSIUM CHLORIDE, AND CALCIUM CHLORIDE .6; .31; .03; .02 G/100ML; G/100ML; G/100ML; G/100ML
1000 INJECTION, SOLUTION INTRAVENOUS
Status: DISCONTINUED | OUTPATIENT
Start: 2022-08-09 | End: 2022-08-09 | Stop reason: HOSPADM

## 2022-08-09 RX ORDER — MAGNESIUM SULFATE HEPTAHYDRATE 40 MG/ML
2 INJECTION, SOLUTION INTRAVENOUS CONTINUOUS
Status: DISCONTINUED | OUTPATIENT
Start: 2022-08-09 | End: 2022-08-11 | Stop reason: HOSPADM

## 2022-08-09 RX ADMIN — SODIUM CHLORIDE, POTASSIUM CHLORIDE, SODIUM LACTATE AND CALCIUM CHLORIDE: 600; 310; 30; 20 INJECTION, SOLUTION INTRAVENOUS at 09:21

## 2022-08-09 RX ADMIN — MAGNESIUM SULFATE HEPTAHYDRATE 4 G: 40 INJECTION, SOLUTION INTRAVENOUS at 09:21

## 2022-08-09 RX ADMIN — SODIUM CHLORIDE 5 MILLION UNITS: 900 INJECTION INTRAVENOUS at 10:05

## 2022-08-09 RX ADMIN — OXYCODONE 5 MG: 5 TABLET ORAL at 20:13

## 2022-08-09 RX ADMIN — IBUPROFEN 800 MG: 800 TABLET, FILM COATED ORAL at 20:13

## 2022-08-09 RX ADMIN — LIDOCAINE HYDROCHLORIDE,EPINEPHRINE BITARTRATE 5 ML: 15; .005 INJECTION, SOLUTION EPIDURAL; INFILTRATION; INTRACAUDAL; PERINEURAL at 10:50

## 2022-08-09 RX ADMIN — SODIUM CHLORIDE 2.5 MILLION UNITS: 9 INJECTION, SOLUTION INTRAVENOUS at 13:58

## 2022-08-09 RX ADMIN — ROPIVACAINE HYDROCHLORIDE: 2 INJECTION, SOLUTION EPIDURAL; INFILTRATION at 10:59

## 2022-08-09 RX ADMIN — MAGNESIUM SULFATE HEPTAHYDRATE 2 G/HR: 40 INJECTION, SOLUTION INTRAVENOUS at 09:45

## 2022-08-09 RX ADMIN — OXYTOCIN 2000 ML/HR: 10 INJECTION, SOLUTION INTRAMUSCULAR; INTRAVENOUS at 16:30

## 2022-08-09 RX ADMIN — OXYTOCIN 125 ML/HR: 10 INJECTION, SOLUTION INTRAMUSCULAR; INTRAVENOUS at 17:11

## 2022-08-09 RX ADMIN — SODIUM CHLORIDE, POTASSIUM CHLORIDE, SODIUM LACTATE AND CALCIUM CHLORIDE 500 ML: 600; 310; 30; 20 INJECTION, SOLUTION INTRAVENOUS at 18:45

## 2022-08-09 RX ADMIN — BUPIVACAINE HYDROCHLORIDE 7 ML: 2.5 INJECTION, SOLUTION EPIDURAL; INFILTRATION; INTRACAUDAL; PERINEURAL at 10:50

## 2022-08-09 RX ADMIN — MISOPROSTOL 1000 MCG: 200 TABLET ORAL at 16:40

## 2022-08-09 RX ADMIN — OXYTOCIN 2 MILLI-UNITS/MIN: 10 INJECTION, SOLUTION INTRAMUSCULAR; INTRAVENOUS at 12:52

## 2022-08-09 RX ADMIN — ROPIVACAINE HYDROCHLORIDE: 2 INJECTION, SOLUTION EPIDURAL; INFILTRATION; PERINEURAL at 10:59

## 2022-08-09 RX ADMIN — TRANEXAMIC ACID 1000 MG: 100 INJECTION, SOLUTION INTRAVENOUS at 16:40

## 2022-08-09 ASSESSMENT — LIFESTYLE VARIABLES
ALCOHOL_USE: NO
TOTAL SCORE: 0
HAVE PEOPLE ANNOYED YOU BY CRITICIZING YOUR DRINKING: NO
CONSUMPTION TOTAL: INCOMPLETE
TOTAL SCORE: 0
EVER FELT BAD OR GUILTY ABOUT YOUR DRINKING: NO
HAVE YOU EVER FELT YOU SHOULD CUT DOWN ON YOUR DRINKING: NO
TOTAL SCORE: 0
EVER HAD A DRINK FIRST THING IN THE MORNING TO STEADY YOUR NERVES TO GET RID OF A HANGOVER: NO

## 2022-08-09 ASSESSMENT — PATIENT HEALTH QUESTIONNAIRE - PHQ9
SUM OF ALL RESPONSES TO PHQ9 QUESTIONS 1 AND 2: 0
2. FEELING DOWN, DEPRESSED, IRRITABLE, OR HOPELESS: NOT AT ALL
1. LITTLE INTEREST OR PLEASURE IN DOING THINGS: NOT AT ALL

## 2022-08-09 ASSESSMENT — PAIN DESCRIPTION - PAIN TYPE: TYPE: ACUTE PAIN

## 2022-08-09 NOTE — ANESTHESIA PREPROCEDURE EVALUATION
Date: 08/09/22  Procedure: Labor Epidural         Relevant Problems   NEURO   (positive) History of PCOS      Other   (positive) BILLY (generalized anxiety disorder)       Physical Exam    Airway   Mallampati: II  TM distance: >3 FB  Neck ROM: full       Cardiovascular - normal exam  Rhythm: regular  Rate: normal  (-) murmur     Dental - normal exam           Pulmonary - normal exam  Breath sounds clear to auscultation     Abdominal    Neurological - normal exam                 Anesthesia Plan    ASA 2       Plan - epidural   Neuraxial block will be labor analgesia                  Pertinent diagnostic labs and testing reviewed    Informed Consent:    Anesthetic plan and risks discussed with patient.

## 2022-08-09 NOTE — PROGRESS NOTES
L&D Progress note    S: Pt is doing well. Pt has progressed well from  to -. Pt currently has an epidural. Pt is otherwise without complaints.  Feeling slight pressure with contractions.  BP was 141/72 while I was in the room.      Barlow was placed at 1130 with good urine output.     O:   Vitals:    22 1235 22 1250 22 1255 22 1316   BP: (!) 153/90 (!) 159/90 (!) 159/95 (!) 142/96   Pulse: 82 75 75 83   Temp:       TempSrc:       Weight:       Height:         SVE: --  FHT: 130  Govan: irregular and infrequent     A/P: 27 y.o.  at 38w1d doing well and progressing  -AROM to be performed after 2nd dose of penicillin administered at approx 1400  -Pitocin to be started close to AROM  - Continue routine labor care  - Continue close observation    Mora Gonzalez D.O.  PGY-1

## 2022-08-09 NOTE — PROGRESS NOTES
0930 report received, plan of care discussed. Pt breathing with ucs, pt being hydrated for Epidural.   0945 Magnesium sulfate bolus complete, Magnesium to 2gm/hr.   1040 Dr Funes in room for epidural placement. Pt sitting up.   1050 Epidural placed without incident.   1130 Pt comfortable with Epidural.   1250 Pitocin started at 2mu/min per order. Pt comfortable, family at bedside.   1520 Complete and set up in stirrups for pushing.   1530 Instructed in pushing techniques. Dr. Gonzalez and Rn at bedside during pushing. Family supportive.   1825 Dr. Finnegan updated regarding vital signs, orders received for 500 ml bolus of LR.   1900 Report to Janet LAST RN

## 2022-08-09 NOTE — ED PROVIDER NOTES
LABOR AND DELIVERY TRIAGE NOTE    PATIENT ID:  NAME:  Nathalia Olson  MRN:               [id number]  YOB: 1995     27 y.o. female  at 38w1d.    Subjective: Ms. Nathalia Olson is a 27 y.o.  @ 38w1d by US presents due to contractions for the past day.  Pt is seen has high risk pregnancy for IVF.  She has been having increased Bps at her last few prenatal visits.  She denies any headache or dizziness.  She states she lost her mucus plug yesterday and has been having pink vaginal discharge since then.      Pregnancy complicated by high blood pressure readings in 3rd trimester and GBS positive.  High risk pregnancy due to IVF.      BP was 175/96 on presentation with a small cuff, remeasured at 154/104 with regular cuff, increased to 179/106 shortly after.     positive for contractions  positive pain   negative for LOF  scant vaginal bleeding  positive for fetal movement    PNL:  Blood Type B+, Rubella immune, HIV neg, TrepAb neg, HBsAg NR, GC/CT neg/neg  1 HR GTT: 115  3 HR GTT: 84,151,147,64  GBS POSITIVE      ROS: Patient denies any fever chills, nausea, vomiting, headache, chest pain, shortness of breath, or dysuria or unusual swelling of hands or feet.     PMHx:   No past medical history on file.     OB History    Para Term  AB Living   2 0 0 0 1 0   SAB IAB Ectopic Molar Multiple Live Births   1 0 0   0        # Outcome Date GA Lbr Enrique/2nd Weight Sex Delivery Anes PTL Lv   2 Current            1 SAB                GYN:  - previous SAB in 1st pregnancy.      PSHx:  Past Surgical History:   Procedure Laterality Date   • BREAST RECONSTRUCTION  2018       Social Hx:  Social History     Tobacco Use   • Smoking status: Never Smoker   • Smokeless tobacco: Never Used   Vaping Use   • Vaping Use: Never used   Substance Use Topics   • Alcohol use: No     Alcohol/week: 0.0 oz   • Drug use: No         Medications:  No current facility-administered medications on file prior to  "encounter.     Current Outpatient Medications on File Prior to Encounter   Medication Sig Dispense Refill   • Prenatal MV-Min-Fe Fum-FA-DHA (PRENATAL 1 PO) Take  by mouth.     • ASPIRIN LOW DOSE 81 MG EC tablet      • metFORMIN ER (GLUCOPHAGE XR) 500 MG TABLET SR 24 HR START WITH TAKING 1 TABLET BY MOUTH TWICE FOR 1 WEEK THEN TAKE 2 TABLETS IN THE MORNING AND 1 TABLET IN THE EVENING ONCE DAILY FOR 1 WEEK THEN TAKE 2 TABLETS TWICE DAILY FROM THEN ON         Allergies:  NKDA      Objective:    Vitals:    22 0809 22 0820 22 0837 22 0847   BP: (!) 156/85 (!) 154/104 (!) 177/106 (!) 179/106   Pulse: 64 63 63 67   Temp:  36.8 °C (98.2 °F)     TempSrc:  Temporal     Weight:  68.9 kg (152 lb)     Height:  1.6 m (5' 3\")       Temp (24hrs), Av.8 °C (98.2 °F), Min:36.8 °C (98.2 °F), Max:36.8 °C (98.2 °F)    General: No acute distress, resting comfortably in bed.  HEENT: normocephalic, nontraumatic, PERRLA, EOMI  Cardiovascular: Heart RRR with no murmurs, rubs or gallops. Distal Pulses 2+  Respiratory: symmetric chest expansion, lungs CTAB, with no wheezes, rales, rhonci  Abdomen: gravid, nontender  Musculoskeletal: BROCK spontaneously  Neuro: non focal with no numbness, tingling or changes in sensation    Cervix:  5cm/90%/-1  [place]: Irregular Uterine Contractions Q2-3 minutes.   FHRM: Baseline 140, mod variability, + accels, no decels    Labs:   GBS POSITIVE AT 36 WEEKS    Assessment: 27 y.o. female  at 38w1d presents in labor    Plan:   - Admit pt to L&D for anticipated delivery  - Preeclampsia labs ordered: CMP, CBC, UPCR  -Severe HTN - Mg to be started - 4g bolus followed by 2g/hour  -GBS POSITIVE: 2 dose Penicillin regimen started: 5 million units followed by 2 millions units Q4h  -Plan for AROM after 2nd dose of Penicillin  -COD ordered   -Labor augmentation with Pitocin: 2 x 2 every 30 min. - to be started after 2nd dose of penicillin  - Initiate routine intrapartum care  - Cat 1 tracing, " continue to monitor EFM  - LR @ 125ml/h      Discussed case with Dr. Finnegan, [facility] Attending.     Mora Gonzalez D.O.  PGY-1

## 2022-08-09 NOTE — L&D DELIVERY NOTE
Vaginal Delivery Procedure Note:    Nathalia Olson is a 27 y.o. , admitted for onset of labor.  Her labor course was appropriate.      PreDelivery Diagnosis:  1. SIUP @ 38w1d  2. Pre-eclampsia with severe features      PostDelivery Diagnosis:  1. S/p   2. Post-partum hemorrhage  3. Pre-eclampsia with severe features     Procedure in Detail:  Pt pushing dorsal lithotomy position.  Head delivered easily and restituted towards maternal right.  Anterior shoulder followed easily with gentle downwards pressure followed by the posterior shoulder and body.  Female infant delivered @ approx 1640.  There was no nuchal cord.  Infant was placed on the maternal abdomen and was stimulated and bulb suctioned.  Cord clamping was delayed x60 seconds then the cord was clamped x2 and cut. Placenta delivered spontaneously intact at approx 1645. 3 Vessel cord.  Cord gases were not sent.      Uterus was boggy following delivery of placenta.  IV pitocin was started and fundal massage was continued.  With continued bleeding, TXA and sublingual misoprostol were administered.  2 clots were passed from uterus and bleeding began to decrease.  Uterus became firm.     The vagina and perineum were examined revealing a second degree perineal tear (repaired) and 2 superficial intravaginal lacerations (repaired with 1 suture each).      Infant APGARs 8 and 8 and 1 and 5 minutes, respectively (for color), infant to be transferred to nursery.  Birth weight pending.   Pt and infant left bonding in LDR.    EBL 800ml  Anesthesia - Epidural  Sponge and needle counts correct.  Patient tolerated procedure well.    Plan:  Anticipate routine postpartum care.  Continue Mg for 24 hours - Pt to be D/c'd if stable 24 hours after Mg D/c'd.    CBC tomorrow morning  Close BP monitoring  PRN oxytocin for excessive vaginal bleeding      Mora Gonzalez D.O.   PGY-1

## 2022-08-09 NOTE — ANESTHESIA PROCEDURE NOTES
Epidural Block    Date/Time: 8/9/2022 10:50 AM  Performed by: Bora Funes M.D.  Authorized by: Bora Funes M.D.     Patient Location:  OB  Start Time:  8/9/2022 10:50 AM  Reason for Block: labor analgesia    patient identified, IV checked, site marked, risks and benefits discussed, surgical consent, monitors and equipment checked, pre-op evaluation and timeout performed    Patient Position:  Sitting  Prep: ChloraPrep, patient draped and sterile technique    Monitoring:  Blood pressure, continuous pulse oximetry and heart rate  Approach:  Midline  Location:  L3-L4  Injection Technique:  DONAL saline  Skin infiltration:  Lidocaine  Strength:  1%  Dose:  3ml  Needle Type:  Tuohy  Needle Gauge:  17 G  Needle Length:  3.5 in  Loss of resistance::  4.5  Catheter Size:  19 G  Catheter at Skin Depth:  10  Test Dose Result:  Negative

## 2022-08-09 NOTE — PROGRESS NOTES
L&D Progress note    S: Pt is doing well. Pt has progressed well from 6-/-1 to 9-10/100/+1. Pt currently has an epidural. Pt is otherwise without complaints.    O:   Vitals:    22 1415   BP: (!) 148/94   Pulse: 83   Temp:    SpO2: 96%      2nd dose of Penicillin given at 1400  AROM at 1415.     Pitocin currently at 4 jennifer units     SVE: 9-10/100/+1  FHT: 130  Clever: Contractions every 3 min    A/P: 27 y.o.  at 38w1d doing well  - Continue routine labor care  - Continue close observation    Mora Gonzalez D.O.  PGY-1

## 2022-08-09 NOTE — PROGRESS NOTES
0806- pt to triage bed 4 c/o uc for 1 day. Pt denies lof or bleeding and states +fm. 1st bp taken with small cuff in severe range. bp cuff size switched to regular  and bp decreased but still elevated. bp set to recycle every 10 mins. Pt denies ha,blurred vision,epigastric pain and no clonus noted. Pt on efm/toco with audible fht 145.     0820- resident dr león at bedside    0830- dr león speaking to dr lewis and will placed admission orders for labor    0850- charge rn aware of admission and [facility] orders for elevated bp    0855- report given tristen osorio

## 2022-08-09 NOTE — H&P
OB H&P:    CC: Contractions    HPI:  Ms. Nathalia Olson is a 27 y.o.  @ 38w1d by US presents due to contractions for the past day.  Pt is seen has high risk pregnancy for IVF.  She has been having increased Bps at her last few prenatal visits.  She denies any headache or dizziness.  She states she lost her mucus plug yesterday and has been having pink vaginal discharge since then.  She would like an epidural for delivery.     BP was 175/96 on presentation with a small cuff, remeasured at 154/104 with regular cuff, increased to 179/106 shortly after.     Contractions: Yes   Loss of fluid: No   Vaginal bleeding: scant   Fetal movement: present    Pt is GBS POSITIVE    PNC with [facility]    PNL:  Blood Type B+, Rubella immune, HIV neg, TrepAb neg, HBsAg NR, GC/CT neg/neg  1 HR GTT: 115  3 HR GTT: 84, 151, 147,64  GBS POSITIVE       ROS:  Const: denies fevers, general concerns  CV/resp: reports no concerns  GI: wavelike abdominal pain/pressure  : see HPI  Neuro: denies HA/vision changes    OB History    Para Term  AB Living   2 0 0 0 1 0   SAB IAB Ectopic Molar Multiple Live Births   1 0 0   0        # Outcome Date GA Lbr Enrique/2nd Weight Sex Delivery Anes PTL Lv   2 Current            1 SAB                GYN:  - SAB with 1st pregnancy    No past medical history on file.    Past Surgical History:   Procedure Laterality Date   • BREAST RECONSTRUCTION         No current facility-administered medications on file prior to encounter.     Current Outpatient Medications on File Prior to Encounter   Medication Sig Dispense Refill   • Prenatal MV-Min-Fe Fum-FA-DHA (PRENATAL 1 PO) Take  by mouth.     • ASPIRIN LOW DOSE 81 MG EC tablet      • metFORMIN ER (GLUCOPHAGE XR) 500 MG TABLET SR 24 HR START WITH TAKING 1 TABLET BY MOUTH TWICE FOR 1 WEEK THEN TAKE 2 TABLETS IN THE MORNING AND 1 TABLET IN THE EVENING ONCE DAILY FOR 1 WEEK THEN TAKE 2 TABLETS TWICE DAILY FROM THEN ON    "      Family History   Problem Relation Age of Onset   • Hypertension Mother        Social History     Tobacco Use   • Smoking status: Never Smoker   • Smokeless tobacco: Never Used   Vaping Use   • Vaping Use: Never used   Substance Use Topics   • Alcohol use: No     Alcohol/week: 0.0 oz   • Drug use: No         PE:  Vitals:    22 0809 22 0820 22 0837 22 0847   BP: (!) 156/85 (!) 154/104 (!) 177/106 (!) 179/106   Pulse: 64 63 63 67   Temp:  36.8 °C (98.2 °F)     TempSrc:  Temporal     Weight:  68.9 kg (152 lb)     Height:  1.6 m (5' 3\")         GEN: AAO, NAD  HEENT: normocephalic, atraumatic, EOMI  CV: rrr, no m/r/g  Resp: CTAB, no w/r/r  Abd: soft, gravid, NT, EFW 10% at last US (at 37w2d)  Ext: NT, no peripheral edema  Derm: no visible lesions or rashes  Neuro: grossly intact    SVE: 5/90%/-1  FHT: Baseline 140/mod variability/+ accels/ - decels  Ary: Contractions irregular - every 2-3 min    A/P: 27 y.o.  @ 38w1d by US presents in labor.     - Admit to L&D  - Preeclampsia labs ordered: CMP, CBC, UPCR  -Severe HTN - Mg started - 4g bolus followed by 2g/hour  -GBS POSITIVE: 2 dose Penicillin regimen started: 5 million units followed by 2 millions units Q4h  -Plan for AROM after 2nd dose of Penicillin  -COD ordered   -Labor augmentation with Pitocin: 2 x 2 every 30 min. - to be started after 2nd dose of penicillin  - Initiate routine intrapartum care  - Cat 1 tracing, continue to monitor EFM  - LR @ 125ml/h  -Epidural to be placed      Mora Gonzalez D.O.  PGY-1      "

## 2022-08-10 ENCOUNTER — APPOINTMENT (OUTPATIENT)
Dept: OBGYN | Facility: CLINIC | Age: 27
End: 2022-08-10
Payer: COMMERCIAL

## 2022-08-10 LAB
ERYTHROCYTE [DISTWIDTH] IN BLOOD BY AUTOMATED COUNT: 43.9 FL (ref 35.9–50)
HCT VFR BLD AUTO: 24.8 % (ref 37–47)
HGB BLD-MCNC: 8.2 G/DL (ref 12–16)
MAGNESIUM SERPL-MCNC: 5.8 MG/DL (ref 1.5–2.5)
MCH RBC QN AUTO: 28.5 PG (ref 27–33)
MCHC RBC AUTO-ENTMCNC: 33.1 G/DL (ref 33.6–35)
MCV RBC AUTO: 86.1 FL (ref 81.4–97.8)
PLATELET # BLD AUTO: 221 K/UL (ref 164–446)
PMV BLD AUTO: 11.4 FL (ref 9–12.9)
RBC # BLD AUTO: 2.88 M/UL (ref 4.2–5.4)
WBC # BLD AUTO: 13.9 K/UL (ref 4.8–10.8)

## 2022-08-10 PROCEDURE — 700102 HCHG RX REV CODE 250 W/ 637 OVERRIDE(OP)

## 2022-08-10 PROCEDURE — A9270 NON-COVERED ITEM OR SERVICE: HCPCS

## 2022-08-10 PROCEDURE — 700111 HCHG RX REV CODE 636 W/ 250 OVERRIDE (IP): Performed by: OBSTETRICS & GYNECOLOGY

## 2022-08-10 PROCEDURE — 83735 ASSAY OF MAGNESIUM: CPT

## 2022-08-10 PROCEDURE — 700105 HCHG RX REV CODE 258: Performed by: OBSTETRICS & GYNECOLOGY

## 2022-08-10 PROCEDURE — 36415 COLL VENOUS BLD VENIPUNCTURE: CPT

## 2022-08-10 PROCEDURE — 770002 HCHG ROOM/CARE - OB PRIVATE (112)

## 2022-08-10 PROCEDURE — 85027 COMPLETE CBC AUTOMATED: CPT

## 2022-08-10 RX ADMIN — IBUPROFEN 800 MG: 800 TABLET, FILM COATED ORAL at 22:56

## 2022-08-10 RX ADMIN — IBUPROFEN 800 MG: 800 TABLET, FILM COATED ORAL at 04:23

## 2022-08-10 RX ADMIN — IBUPROFEN 800 MG: 800 TABLET, FILM COATED ORAL at 15:35

## 2022-08-10 RX ADMIN — MAGNESIUM SULFATE HEPTAHYDRATE 2 G/HR: 40 INJECTION, SOLUTION INTRAVENOUS at 04:24

## 2022-08-10 RX ADMIN — SODIUM CHLORIDE, POTASSIUM CHLORIDE, SODIUM LACTATE AND CALCIUM CHLORIDE: 600; 310; 30; 20 INJECTION, SOLUTION INTRAVENOUS at 12:00

## 2022-08-10 ASSESSMENT — PAIN DESCRIPTION - PAIN TYPE
TYPE: ACUTE PAIN

## 2022-08-10 ASSESSMENT — PAIN SCALES - GENERAL: PAIN_LEVEL: 2

## 2022-08-10 NOTE — LACTATION NOTE
This note was copied from a baby's chart.  Mom is a 26 y/o P1 who delivered baby girl weighing   5 # 7.3 oz at 38.1 wks. Mom is currently on Mag for Pre-eclampsia with severe features. Mom denies diabetes or Thyroid issues. She does state she has PCOS and this was an IVF baby. Mom also had a breast augmentation, under the muscle, in 2018. She reports positive breast changes during pregnancy and darken areolas.   Mom states baby fed well this morning at 0800 but has been to sleepy to latch otherwise. Pediatrician wants baby to be started on supplementation after breastfeeding attempts. Baby was 5 # 7.3 oz and feedings have been suboptimal.   Mom has the supplementation feeding guidelines and stated that baby just fed approx 4 mls an hour ago.   LC reviewed the 3 step plan and discussed having  all three step completed within 45-50 minutes. FOB is at bedside and supportive in care. LC recommended that mom attempt to latch for about 15-20 minutes before moving on to bottle feeding. LC highly reccommended that mom call for assist with next latch and if baby is too sleepy to take the bottle after,  bedside RN needs to be notified.   Mom has an [place] pump for use at home. Currently she using the [employer] HG pump and has setting comfortably set on 80/40 for 2-3 minutes and 6-/40 for the remainder . Mom reports only wetness on the flanges. LC encouraged to continue the 3 step plan and she be seen in morning for further evaluation of overnight results.

## 2022-08-10 NOTE — ANESTHESIA TIME REPORT
Anesthesia Start and Stop Event Times     Date Time Event    8/9/2022 1040 Ready for Procedure     1040 Anesthesia Start     1626 Anesthesia Stop        Responsible Staff  08/09/22    Name Role Begin End    Bora Funes M.D. Anesth 1040 1626        Overtime Reason:  no overtime (within assigned shift)    Comments:

## 2022-08-10 NOTE — PROGRESS NOTES
2100 Admitted from L and D per wheelchair, pt oriented to room, educated her to call if she needs assistance, with sequential stocking bilaterally,  Assessment done with light bleeding, Abdomen soft non distended. Barlow catheter in paced, DTR checked no magnesium toxicity noted, pt educated monitoring fluids intake and output, Denies pain at this time, Admission care rendered.

## 2022-08-10 NOTE — PROGRESS NOTES
1900: report received from ANA Cano. Pt denies needs. Fundus firm at umbilicus and bleeding light  2015: pt requesting something extra for pain, verbal order per VIR[place]IA Bose for Roxicodone and Ibuprofen  2100: pt ambulated to wheelchair without difficulty. Verbal ok per Dr. Finnegan for transfer to . She was transferred to  in stable condition. Report given to ANA Bingham

## 2022-08-10 NOTE — ANESTHESIA POSTPROCEDURE EVALUATION
Patient: Nathalia Olson    Procedure Summary     Date: 08/09/22 Room / Location:     Anesthesia Start: 1040 Anesthesia Stop: 1626    Procedure: Labor Epidural Diagnosis:     Scheduled Providers:  Responsible Provider: Bora Funes M.D.    Anesthesia Type: epidural ASA Status: 2          Final Anesthesia Type: epidural  Last vitals  BP   Blood Pressure: 127/89    Temp   36.4 °C (97.6 °F)    Pulse   76   Resp   16    SpO2   93 %      Anesthesia Post Evaluation    Patient location during evaluation: floor  Patient participation: complete - patient participated  Level of consciousness: awake and alert  Pain score: 2    Airway patency: patent  Anesthetic complications: no  Cardiovascular status: hemodynamically stable  Respiratory status: acceptable  Hydration status: euvolemic    PONV: none          There were no known notable events for this encounter.     Nurse Pain Score: 0 (NPRS)

## 2022-08-10 NOTE — PROGRESS NOTES
"S: Pt is PPD# 1 s/p , doing well. Pt is tolerating reg diet, passing gas, and voiding. Pt is ambulating. Pt reports good pain control. Nml Lochia.  Pt feels ready to go home at this time, it was explained to her that she has to wait 24 hours after the Mg is stopped.    Pt is on Mag for preeclampsia, started at approx 1600 on  shortly after delivery.    Pt lost estimated 800ml of blood at delivery.  Pt no longer feels dizzy.  She denies any HA, SOB or vision changes.    Uterus was very tender after massage, it is decreasing at this time.  Vaginal bleeding has significantly decreased as well.      Baby girl, \"Mila\", is not latching well.  Pt has not yet met with lactation consultation.  She has pumped a small bit of colostrum.      O:   Vitals:    08/10/22 0601   BP: 127/89   Pulse: 76   Resp: 16   Temp: 36.4 °C (97.6 °F)   SpO2: 93%     GEN: NAD  Cardio: RRR, no murmurs or rubs  Resp: CTAB  Abd: Soft, non-tender, fundus firm below U  Ext: soft, NT, nml edema    Labs:   Recent Labs     22  0845 08/10/22  0553   WBC 12.7* 13.9*   RBC 4.42 2.88*   HEMOGLOBIN 12.3 8.2*   HEMATOCRIT 38.0 24.8*   MCV 86.0 86.1   MCH 27.8 28.5   MCHC 32.4* 33.1*   RDW 42.3 43.9   PLATELETCT 271 221   MPV 11.7 11.4   Mg level: 5.8    A/P: 27 y.o.  PPD# 1 doing well.  -Bps controlled at this time - Continue to monitor BP  -Mg to be continued till 1600 8/10/22 - pt to be observed for at least 24 hrs after Mg stopped.  Will address possible D/C tomorrow late afternoon.   -Hb was 8.2 this morning- s/p PPH - will continue to monitor sx and can consider another CBC.    -Lactation Consultation  - Continue pain management  - Continue to encourage ambulation  - Continue routine PP care/support  - Anticipate D/C to home PPD#2-3    "

## 2022-08-11 VITALS
SYSTOLIC BLOOD PRESSURE: 145 MMHG | BODY MASS INDEX: 26.93 KG/M2 | RESPIRATION RATE: 18 BRPM | WEIGHT: 152 LBS | OXYGEN SATURATION: 99 % | HEIGHT: 63 IN | TEMPERATURE: 99.2 F | HEART RATE: 76 BPM | DIASTOLIC BLOOD PRESSURE: 90 MMHG

## 2022-08-11 PROCEDURE — 700102 HCHG RX REV CODE 250 W/ 637 OVERRIDE(OP)

## 2022-08-11 PROCEDURE — A9270 NON-COVERED ITEM OR SERVICE: HCPCS

## 2022-08-11 RX ORDER — IBUPROFEN 800 MG/1
800 TABLET ORAL EVERY 8 HOURS PRN
Qty: 30 TABLET | Refills: 0 | Status: SHIPPED | OUTPATIENT
Start: 2022-08-11 | End: 2023-09-21

## 2022-08-11 RX ORDER — PSEUDOEPHEDRINE HCL 30 MG
100 TABLET ORAL 2 TIMES DAILY PRN
Qty: 60 CAPSULE | Refills: 0 | Status: SHIPPED | OUTPATIENT
Start: 2022-08-11 | End: 2023-09-21

## 2022-08-11 RX ORDER — ACETAMINOPHEN 500 MG
1000 TABLET ORAL EVERY 6 HOURS PRN
Qty: 30 TABLET | Refills: 0 | Status: SHIPPED | OUTPATIENT
Start: 2022-08-11 | End: 2023-09-21

## 2022-08-11 RX ADMIN — IBUPROFEN 800 MG: 800 TABLET, FILM COATED ORAL at 07:51

## 2022-08-11 ASSESSMENT — EDINBURGH POSTNATAL DEPRESSION SCALE (EPDS)
I HAVE BEEN ANXIOUS OR WORRIED FOR NO GOOD REASON: HARDLY EVER
I HAVE LOOKED FORWARD WITH ENJOYMENT TO THINGS: AS MUCH AS I EVER DID
THE THOUGHT OF HARMING MYSELF HAS OCCURRED TO ME: NEVER
I HAVE BEEN ABLE TO LAUGH AND SEE THE FUNNY SIDE OF THINGS: AS MUCH AS I ALWAYS COULD
I HAVE BEEN SO UNHAPPY THAT I HAVE HAD DIFFICULTY SLEEPING: NOT AT ALL
I HAVE BEEN SO UNHAPPY THAT I HAVE BEEN CRYING: NO, NEVER
I HAVE BLAMED MYSELF UNNECESSARILY WHEN THINGS WENT WRONG: NOT VERY OFTEN
I HAVE FELT SCARED OR PANICKY FOR NO GOOD REASON: NO, NOT MUCH
THINGS HAVE BEEN GETTING ON TOP OF ME: NO, I HAVE BEEN COPING AS WELL AS EVER
I HAVE FELT SAD OR MISERABLE: NOT VERY OFTEN

## 2022-08-11 ASSESSMENT — PAIN DESCRIPTION - PAIN TYPE: TYPE: ACUTE PAIN

## 2022-08-11 NOTE — DISCHARGE INSTRUCTIONS
PATIENT DISCHARGE EDUCATION INSTRUCTION SHEET  Follow up:  [facility] TOMORROW FOR BP CHECK.      REASONS TO CALL YOUR OBSTETRICIAN  Persistent fever, shaking, chills (Temperature higher than 100.4) may indicate you have an infection  Heavy bleeding: soaking more than 1 pad per hour; Passing clots an egg-sized clot or bigger may mean you have an postpartum hemorrhage  Foul odor from vagina or bad smelling or discolored discharge or blood  Breast infection (Mastitis symptoms); breast pain, chills, fever, redness or red streaks, may feel flu like symptoms  Urinary pain, burning or frequency  Incision that is not healing, increased redness, swelling, tenderness or pain, or any pus from episiotomy or  site may mean you have an infection  Redness, swelling, warmth, or painful to touch in the calf area of your leg may mean you have a blood clot  Severe or intensified depression, thoughts or feelings of wanting to hurt yourself or someone else   Pain in chest, obstructed breathing or shortness of breath (trouble catching your breath) may mean you are having a postpartum complication. Call your provider immediately   Headache that does not get better, even after taking medicine, a bad headache with vision changes or pain in the upper right area of your belly may mean you have high blood pressure or post birth preeclampsia. Call your provider immediately    HAND WASHING  All family and friends should wash their hands:  Before and after holding the baby  Before feeding the baby  After using the restroom or changing the baby's diaper    WOUND CARE  Ask your physician for additional care instructions. In general:   Incision:  May shower and pat incision dry   Keep the incision clean and dry  There should not be any opening or pus from the incision  Continue to walk at home 3 times a day   Do NOT lift anything heavier than your baby (over 10 pounds)  Encourage family to help participate in care of  the  to allow rest and mom time to heal  Episiotomy/Laceration  May use gricelda-spray bottle, witch hazel pads and dermaplast spray for comfort  Use gricelda-spray bottle after urinating to cleanse perineal area  To prevent burning during urination spray gricelda-water bottle on labial area   Pat perineal area dry until episiotomy/laceration is healed  Continue to use gricelda-bottle until bleeding stops as needed  If have a 2nd degree laceration or greater, a Sitz bath can offer relief from soreness, burning, and inflammation   Sitz Bath   Sit in 6 inches of warm water and soak laceration as needed until the laceration heals    VAGINAL CARE AND BLEEDING  Nothing inside vagina for 6 weeks:   No sexual intercourse, tampons or douching  Bleeding may continue for 2-4 weeks. Amount and color may vary  Soaking 1 pad or more in an hour for several hours is considered heavy bleeding  Passing large egg sized blood clots can be concerning  If you feel like you have heavy bleeding or are having increasing amount of blood clots call your Obstetrician immediately  If you begin feeling faint upon standing, feeling sick to your stomach, have clammy skin, a really fast heartbeat, have chills, start feeling confused, dizzy, sleepy or weak, or feeling like you're going to faint call your Obstetrician immediately    HYPERTENSION   Preeclampsia or gestational hypertension are types of high blood pressure that only pregnant women can get. It is important for you to be aware of symptoms to seek early intervention and treatment. If you have any of these symptoms immediately call your Obstetrician    Vision changes or blurred vision   Severe headache or pain that is unrelieved with medication and will not go away  Persistent pain in upper abdomen or shoulder   Increased swelling of face, feet, or hands  Difficulty breathing or shortness of breath at rest  Urinating less than usual    URINATION AND BOWEL MOVEMENTS  Eating more fiber (bran cereal,  "fruits, and vegetables) and drinking plenty of fluids will help to avoid constipation  Urinary frequency and urgency after childbirth is normal  If you experience any urinary pain, burning or frequency call your provider    BIRTH CONTROL  It is possible to become pregnant at any time after delivery and while breastfeeding  Plan to discuss a method of birth control with your physician at your post delivery follow up visit    POSTPARTUM BLUES  During the first few days after birth, you may experience a sense of the \"blues\" which may include impatience, irritability or even crying. These feelings come and go quickly. However, as many as 1 in 10 women experience emotional symptoms known as postpartum depression.     POSTPARTUM DEPRESSION    May start as early as the second or third day after delivery or take several weeks or months to develop. Symptoms of \"blues\" are present, but are more intense: Crying spells; loss of appetite; feelings of hopelessness or loss of control; fear of touching the baby; over concern or no concern at all about the baby; little or no concern about your own appearance/caring for yourself; and/or inability to sleep or excessive sleeping. Contact your Obstetrician if you are experiencing any of these symptoms     PREVENTING SHAKEN BABY  If you are angry or stressed, PUT THE BABY IN THE CRIB, step away, take some deep breaths, and wait until you are calm to care for the baby. DO NOT SHAKE THE BABY. You are not alone, call a supporter for help.  [facility] 24/7 crisis call line (218-810-4156) or (1-140.244.8523)  You can also text them, text \"ANSWER\" (418987)  "

## 2022-08-11 NOTE — PROGRESS NOTES
5566 Pt doing well bonding with baby with light bleeding, abdomen soft non distended, up to bathroom and voiding without difficulty, negative for [place] signs, ambulated well, complained of mild abdominal pain medicated her as per doctor orders, Needs attended.

## 2022-08-11 NOTE — CARE PLAN
Problem: Knowledge Deficit - Postpartum  Goal: Patient will verbalize and demonstrate understanding of self and infant care  Outcome: Progressing     Problem: Altered Physiologic Condition  Goal: Patient physiologically stable as evidenced by normal lochia, palpable uterine involution and vitals within normal limits  Outcome: Progressing   The patient is hemodynamically stable    Shift Goals: pain controlled, maintain stable blood pressure  Clinical Goals: pain management, maintian stable blood pressure  Patient Goals: pain controlled, breastfeed, rest  Family Goals: rest    Progress made toward(s) clinical / shift goals:  pt verbalized acceptable level of pain    Patient is not progressing towards the following goals:

## 2022-08-11 NOTE — LACTATION NOTE
This note was copied from a baby's chart.  Follow up:    MOB has been continuing 3 step plan overnight, last pump was able to express 3ml.  Infant latches, but remains sleepy at breast and falls asleep after 5-10min.  Has not been able to latch on left side, possibly due to slightly different nipple shape. MOB has been doing paced bottle feeding according to the supplemental feeding guideline.     Using [employer] HG double pump 25mm flanges.  80cpm for 2min drop to 60cpm.  Sxn at 30% for total of 15min, following with 2min HE.  Repeating every 3hrs    Infant had fed 1hr prior to consult.  Asked to call when infant at breast to assist with latch.     Information on how to rent HG pump provided.   Referral sent to [facility]

## 2022-08-11 NOTE — PROGRESS NOTES
0750 Assessment completed. Lochia light, fundus firm. Plan of care reviewed. Patient report cramping pain of 3/10, see MAR for intervention.   0910 Appt for follow-up BP check made fore 8/12 @ 11:00am.   1130 Discharge instructions and education reviewed with patient, emphasized and highlighted symptoms of HTN/Preeclampsia and to go to ER until follow-up appt tomorrow, patient gave verbalized understanding, papers signed.   1200 Left facility escorted by staff.

## 2022-08-11 NOTE — CARE PLAN
The patient is Stable - Low risk of patient condition declining or worsening    Shift Goals  Clinical Goals: Patient will maintain stable Vs; Lochia WDL; Tolerate Mag therapy  Patient Goals: pain controlled, breastfeed, rest  Family Goals: rest    Progress made toward(s) clinical / shift goals:    Problem: Knowledge Deficit - L&D  Goal: Patient and family/caregivers will demonstrate understanding of plan of care, disease process/condition, diagnostic tests and medications  Outcome: Progressing     Problem: Risk for Excess Fluid Volume  Goal: Patient will demonstrate pulse, blood pressure and neurologic signs within expected ranges and without any respiratory complications  Outcome: Progressing     Problem: Pain - Standard  Goal: Alleviation of pain or a reduction in pain to the patient’s comfort goal  Outcome: Progressing     Problem: Knowledge Deficit - Standard  Goal: Patient and family/care givers will demonstrate understanding of plan of care, disease process/condition, diagnostic tests and medications  Outcome: Progressing       Patient is not progressing towards the following goals:

## 2022-08-12 ENCOUNTER — OFFICE VISIT (OUTPATIENT)
Dept: OBGYN | Facility: CLINIC | Age: 27
End: 2022-08-12
Payer: COMMERCIAL

## 2022-08-12 VITALS — DIASTOLIC BLOOD PRESSURE: 105 MMHG | SYSTOLIC BLOOD PRESSURE: 145 MMHG

## 2022-08-12 DIAGNOSIS — I10 CHRONIC HYPERTENSION: ICD-10-CM

## 2022-08-12 PROCEDURE — 99211 OFF/OP EST MAY X REQ PHY/QHP: CPT | Performed by: OBSTETRICS & GYNECOLOGY

## 2022-08-12 NOTE — PROGRESS NOTES
Patient is here for blood pressure check.  Nurses visit only I was notified of elevated blood pressure.  Which is stable  Patient will have repeat blood pressure in 1 week and after visit with myself.

## 2022-08-12 NOTE — PROGRESS NOTES
Pt presents for 1 day PP Blood pressure check as advised on discharge from hospital yesterday . Pt is S/P  @ 38 weeks for severe PIH. She received MGS04 but was not sent home with anti-hypertensives. Pt is doing well and denies HA's, significant swelling, dizzyness or epigastric pain, N/V. Serial blood pressures are taken and read as 148/108, 145/105 and 145/105. Advised  who cared for  her in hospital. He asked that pt be put in a room for f/u eval.  involved in meeting and after approx 40min I asked if pt could go home to breastfeed her baby.After sending her  has recommended pt schedule a visit in 1-2 weeks for repeat BP check and f/u.tmm

## 2022-08-15 ENCOUNTER — TELEPHONE (OUTPATIENT)
Dept: OBGYN | Facility: CLINIC | Age: 27
End: 2022-08-15
Payer: COMMERCIAL

## 2022-09-12 PROBLEM — M65.4 DE QUERVAIN'S TENOSYNOVITIS, BILATERAL: Status: ACTIVE | Noted: 2022-09-12

## 2022-09-30 ASSESSMENT — EDINBURGH POSTNATAL DEPRESSION SCALE (EPDS)
I HAVE FELT SCARED OR PANICKY FOR NO GOOD REASON: NO, NOT AT ALL
TOTAL SCORE: 2
I HAVE BEEN ANXIOUS OR WORRIED FOR NO GOOD REASON: HARDLY EVER
THE THOUGHT OF HARMING MYSELF HAS OCCURRED TO ME: NEVER
I HAVE BEEN SO UNHAPPY THAT I HAVE BEEN CRYING: NO, NEVER
I HAVE FELT SAD OR MISERABLE: NO, NOT AT ALL
I HAVE BEEN ABLE TO LAUGH AND SEE THE FUNNY SIDE OF THINGS: AS MUCH AS I ALWAYS COULD
I HAVE BLAMED MYSELF UNNECESSARILY WHEN THINGS WENT WRONG: NO, NEVER
I HAVE LOOKED FORWARD WITH ENJOYMENT TO THINGS: AS MUCH AS I EVER DID
THINGS HAVE BEEN GETTING ON TOP OF ME: NO, MOST OF THE TIME I HAVE COPED QUITE WELL
I HAVE BEEN SO UNHAPPY THAT I HAVE HAD DIFFICULTY SLEEPING: NOT AT ALL

## 2022-10-03 ENCOUNTER — PATIENT MESSAGE (OUTPATIENT)
Dept: OBGYN | Facility: CLINIC | Age: 27
End: 2022-10-03

## 2022-10-03 ENCOUNTER — POST PARTUM (OUTPATIENT)
Dept: OBGYN | Facility: CLINIC | Age: 27
End: 2022-10-03
Payer: COMMERCIAL

## 2022-10-03 DIAGNOSIS — O92.70 LACTATION PROBLEM: ICD-10-CM

## 2022-10-03 PROCEDURE — 0503F POSTPARTUM CARE VISIT: CPT | Performed by: ADVANCED PRACTICE MIDWIFE

## 2022-10-03 ASSESSMENT — EDINBURGH POSTNATAL DEPRESSION SCALE (EPDS)
I HAVE BEEN SO UNHAPPY THAT I HAVE HAD DIFFICULTY SLEEPING: NOT AT ALL
THINGS HAVE BEEN GETTING ON TOP OF ME: NO, MOST OF THE TIME I HAVE COPED QUITE WELL
I HAVE BEEN SO UNHAPPY THAT I HAVE BEEN CRYING: NO, NEVER
I HAVE BEEN ANXIOUS OR WORRIED FOR NO GOOD REASON: NO, NOT AT ALL
I HAVE BEEN ABLE TO LAUGH AND SEE THE FUNNY SIDE OF THINGS: AS MUCH AS I ALWAYS COULD
I HAVE LOOKED FORWARD WITH ENJOYMENT TO THINGS: AS MUCH AS I EVER DID
TOTAL SCORE: 1
I HAVE FELT SAD OR MISERABLE: NO, NOT AT ALL
I HAVE BLAMED MYSELF UNNECESSARILY WHEN THINGS WENT WRONG: NO, NEVER
THE THOUGHT OF HARMING MYSELF HAS OCCURRED TO ME: NEVER
I HAVE FELT SCARED OR PANICKY FOR NO GOOD REASON: NO, NOT AT ALL

## 2022-10-03 NOTE — NON-PROVIDER
Pt here today for postpartum exam.  Delivery Date 8/9/22   Currently: breast and bottle feeding   BCM: declines , [facility].   Good ph:[phone]  Pt states no complaints   Chaperone offered and not indicated  Last PAP: 2/8/22, WNL   Wt:142lb  BP:109/67   Metronidazole Pregnancy And Lactation Text: This medication is Pregnancy Category B and considered safe during pregnancy.  It is also excreted in breast milk.

## 2022-10-03 NOTE — NON-PROVIDER
"Subjective   Subjective:    Nathalia Olson is a 27 y.o. female who presents for her postpartum exam 8 weeks following . Her prenatal course was complicated by pre-eclampsia with severe features.  Her delivery was complicated by second degree laceration and PPH. Her method of feeding infant is breast and formula supplementation. She desires no her birth control method, states she needed IVF for last pregnancy and does not believe she can get pregnant easily. Reports sex once prior to this appointment, without pain but \"it felt weird\". Patient denies crying spells, irritability, or mood swings. Reports she is returning to work today.    Pap WNL on 22    Eating a regular diet with difficulty.   Bowel movement are Normal.      Breast problems - insufficient supply, reports feeding or pumping q2-3 hrs but still needing to offer infant formula after, is hoping to increase supply  Dysuria - no  Vaginal bleeding - reports some days she has pink/whitish bleeding, but then returns to bright red. Uses 1 pad/day and does not saturate pad. Bright red bleeding today.  Vaginal itching - no      Interested in increasing milk supply  Bright red bleeding, but only using 1 pad a day    Problem List     Patient Active Problem List    Diagnosis Date Noted    De Quervain's tenosynovitis, bilateral 2022    Labor and delivery, indication for care 2022    Cough 2022    Pregnancy conceived through in vitro fertilization 03/10/2022    History of PCOS 03/10/2022    BILLY (generalized anxiety disorder) 2016       Objective    EDPS 1      Lab:No results found for this or any previous visit (from the past 1008 hour(s)).  There were no vitals filed for this visit.  There were no vitals filed for this visit.  Objective    Well nourished female in no acute distress  A& O x 3  Heart RRR  LCTAB  No edema noted and pulses WNL bilaterally in lower extremities; no claudication  BS x 4; no guarding or tenderness  Breasts: no " erythema or discharge. No masses or tenderness.         Assessment   Assessment:    1. Postpartum Exam  2. General Counseling and Advice on Contraception  3. Screening for Postpartum Depression    Plan   Plan:    1. Breastfeeding support, lactation referral  2. Continue PNV   3. Contraceptive counseling- Advised patient that it is recommended to delay pregnancy for at least 12 months, and to use condoms as needed or return for contraception management.  4. Discussed diet, exercise and resumption of sexual activity.  Discussed signs and symptoms of stress incontinence and reviewed pelvic floor exercises.    5. Discussed increased risk for preeclampsia in future pregnancies and need for aspirin in future pregnancies.  6. Discussed normal postpartum lochia and advised to RTC if bleeding increases.  5. Follow up 1 year or PRN    MIKE Mccauley

## 2022-10-03 NOTE — PROGRESS NOTES
"Nathalia Olson is a 27 y.o. female who presents for her postpartum exam 8 weeks following . Her prenatal course was complicated by pre-eclampsia with severe features.  Her delivery was complicated by second degree laceration and PPH. Her method of feeding infant is breast and formula supplementation. She desires no her birth control method, states she needed IVF for last pregnancy and does not believe she can get pregnant easily. Reports sex once prior to this appointment, without pain but \"it felt weird\". Patient denies crying spells, irritability, or mood swings. Reports she is returning to work today.     Pap WNL on 22     Eating a regular diet with difficulty.   Bowel movement are Normal.       Breast problems - insufficient supply, reports feeding or pumping q2-3 hrs but still needing to offer infant formula after, is hoping to increase supply  Dysuria - no  Vaginal bleeding - reports some days she has pink/whitish bleeding, but then returns to bright red. Uses 1 pad/day and does not saturate pad. Bright red bleeding today.  Vaginal itching - no        Interested in increasing milk supply  Bright red bleeding, but only using 1 pad a day     Problem List           Patient Active Problem List     Diagnosis Date Noted    De Quervain's tenosynovitis, bilateral 2022    Labor and delivery, indication for care 2022    Cough 2022    Pregnancy conceived through in vitro fertilization 03/10/2022    History of PCOS 03/10/2022    BILLY (generalized anxiety disorder) 2016         Objective     EDPS 1        Lab:  Recent Results   No results found for this or any previous visit (from the past 1008 hour(s)).       Objective     Well nourished female in no acute distress  A& O x 3  Heart RRR  LCTAB  No edema noted and pulses WNL bilaterally in lower extremities; no claudication  BS x 4; no guarding or tenderness  Breasts: no erythema or discharge. No masses or tenderness.            Assessment "   Assessment:    1. Postpartum Exam  2. General Counseling and Advice on Contraception  3. Screening for Postpartum Depression     Plan   Plan:    1. Breastfeeding support, lactation referral  2. Continue PNV   3. Contraceptive counseling- Advised patient that it is recommended to delay pregnancy for at least 12 months, and to use condoms as needed or return for contraception management.  4. Discussed diet, exercise and resumption of sexual activity.  Discussed signs and symptoms of stress incontinence and reviewed pelvic floor exercises.    5. Discussed increased risk for preeclampsia in future pregnancies and need for aspirin in future pregnancies.  6. Discussed normal postpartum lochia and advised to RTC if bleeding increases.  5. Follow up 1 year or PRN     MIKE Mccauley

## 2023-09-18 PROBLEM — M65.4 DE QUERVAIN'S TENOSYNOVITIS, RIGHT: Status: ACTIVE | Noted: 2023-09-18

## 2023-10-18 ENCOUNTER — DOCUMENTATION (OUTPATIENT)
Dept: HEALTH INFORMATION MANAGEMENT | Facility: OTHER | Age: 28
End: 2023-10-18
Payer: COMMERCIAL

## 2024-02-28 ENCOUNTER — TELEPHONE (OUTPATIENT)
Dept: HEALTH INFORMATION MANAGEMENT | Facility: OTHER | Age: 29
End: 2024-02-28
Payer: COMMERCIAL

## 2024-04-17 PROBLEM — M65.4 DE QUERVAIN'S TENOSYNOVITIS, LEFT: Status: ACTIVE | Noted: 2024-04-17

## 2024-06-07 ENCOUNTER — TELEPHONE (OUTPATIENT)
Dept: OBGYN | Facility: CLINIC | Age: 29
End: 2024-06-07
Payer: COMMERCIAL

## 2025-06-17 ENCOUNTER — APPOINTMENT (OUTPATIENT)
Dept: OBGYN | Facility: CLINIC | Age: 30
End: 2025-06-17
Payer: COMMERCIAL

## 2025-09-03 ENCOUNTER — APPOINTMENT (OUTPATIENT)
Dept: LAB | Facility: MEDICAL CENTER | Age: 30
End: 2025-09-03
Payer: COMMERCIAL